# Patient Record
Sex: FEMALE | Race: WHITE | URBAN - NONMETROPOLITAN AREA
[De-identification: names, ages, dates, MRNs, and addresses within clinical notes are randomized per-mention and may not be internally consistent; named-entity substitution may affect disease eponyms.]

---

## 2022-10-14 ENCOUNTER — TELEPHONE (OUTPATIENT)
Dept: FAMILY MEDICINE CLINIC | Age: 67
End: 2022-10-14

## 2022-10-14 NOTE — TELEPHONE ENCOUNTER
LVM for Maxwell Judge letting her she could Establish, but you do not write a prescription for controlled meds or narcotics. Provided our phone number if she is agreeable to that and wanted to schedule.

## 2022-10-14 NOTE — TELEPHONE ENCOUNTER
Roberto Talavera called from [facility]. He was wanting to know if you would take on this patient? She used to see Lisa Blackmon and she needs a PCP.

## 2023-02-10 ENCOUNTER — OFFICE VISIT (OUTPATIENT)
Dept: FAMILY MEDICINE CLINIC | Age: 68
End: 2023-02-10

## 2023-02-10 VITALS
OXYGEN SATURATION: 98 % | BODY MASS INDEX: 33.23 KG/M2 | HEIGHT: 61 IN | RESPIRATION RATE: 16 BRPM | TEMPERATURE: 98.7 F | WEIGHT: 176 LBS | SYSTOLIC BLOOD PRESSURE: 130 MMHG | DIASTOLIC BLOOD PRESSURE: 82 MMHG | HEART RATE: 92 BPM

## 2023-02-10 DIAGNOSIS — M51.9 LUMBAR DISC DISEASE: ICD-10-CM

## 2023-02-10 DIAGNOSIS — K44.9 HIATAL HERNIA: Primary | ICD-10-CM

## 2023-02-10 DIAGNOSIS — F41.1 GENERALIZED ANXIETY DISORDER: ICD-10-CM

## 2023-02-10 DIAGNOSIS — E55.9 VITAMIN D INSUFFICIENCY: ICD-10-CM

## 2023-02-10 DIAGNOSIS — R73.01 IMPAIRED FASTING GLUCOSE: ICD-10-CM

## 2023-02-10 DIAGNOSIS — K21.9 GASTROESOPHAGEAL REFLUX DISEASE, UNSPECIFIED WHETHER ESOPHAGITIS PRESENT: ICD-10-CM

## 2023-02-10 DIAGNOSIS — Z13.6 SCREENING FOR CARDIOVASCULAR CONDITION: ICD-10-CM

## 2023-02-10 DIAGNOSIS — Z76.89 ENCOUNTER TO ESTABLISH CARE WITH NEW DOCTOR: ICD-10-CM

## 2023-02-10 ASSESSMENT — ENCOUNTER SYMPTOMS
DIARRHEA: 0
COUGH: 0
CONSTIPATION: 0
SHORTNESS OF BREATH: 0
NAUSEA: 0
VOMITING: 0
WHEEZING: 0
ABDOMINAL PAIN: 1
BACK PAIN: 1

## 2023-02-10 ASSESSMENT — PATIENT HEALTH QUESTIONNAIRE - PHQ9
1. LITTLE INTEREST OR PLEASURE IN DOING THINGS: 1
2. FEELING DOWN, DEPRESSED OR HOPELESS: 1
SUM OF ALL RESPONSES TO PHQ QUESTIONS 1-9: 2
SUM OF ALL RESPONSES TO PHQ9 QUESTIONS 1 & 2: 2

## 2023-02-10 NOTE — PROGRESS NOTES
2/10/23  Winsome Espino : 1955 Sex: female  Age: 69 y.o. Chief Complaint   Patient presents with    Establish Care     HPI:  69 y.o. female presents today to establish care with a new PCP. Previously seen by Dr. Gricelda Chun, but over 10 years ago. Hasn't seen a doctor since. Patient's chart, medical, surgical and medication history all reviewed. GERD  Patient complains of heartburn. This has been associated with choking on food, heartburn, and symptoms primarily relate to meals, and lying down after meals. She denies abdominal bloating, belching and eructation, deep pressure at base of neck, need to clear throat frequently, and shortness of breath. Symptoms have been present for several years. She denies dysphagia. She has not lost weight. She denies melena, hematochezia, hematemesis, and coffee ground emesis. Medical therapy in the past has included  TUMS . Had EGD by Dr. Neida Escalante in  that showed hiatal hernia. Back Pain  69 y.o. female presents today with complaint of back pain. The pain started  several  year(s) ago. The patient denies any trauma or injury. Had herniated disc many years ago. Follows with Chiro monthly. She denies numbness, tingling or weakness to the extremities. She denies any saddle anesthesia. No bowel or bladder incontinence. No fever or chills. No dysuria, urinary, frequency, or gross hematuria. No abdominal pain, nausea, vomiting, or diarrhea. No history of kidney stones. Anxiety  Patient complains of evaluation of anxiety disorder. She has the following anxiety symptoms: chest pain, racing thoughts. Onset of symptoms was approximately several years ago, stable since that time. She denies current suicidal and homicidal ideation. Previous treatment includes  none  and  no therapy . She complains of the following side effects from the treatment: none. ROS:  Review of Systems   Constitutional:  Negative for chills, fatigue and fever. Respiratory:  Negative for cough, shortness of breath and wheezing. Cardiovascular:  Negative for chest pain and palpitations. Gastrointestinal:  Positive for abdominal pain. Negative for constipation, diarrhea, nausea and vomiting. Musculoskeletal:  Positive for back pain. Negative for arthralgias. Skin:  Negative for rash. Neurological:  Negative for dizziness and headaches. Psychiatric/Behavioral:  Negative for dysphoric mood. The patient is nervous/anxious. All other systems reviewed and are negative. No current outpatient medications on file prior to visit. No current facility-administered medications on file prior to visit.        No Known Allergies    Past Medical History:   Diagnosis Date    GERD (gastroesophageal reflux disease)     Hiatal hernia     Lumbar disc disease     Shingles      Past Surgical History:   Procedure Laterality Date    CHOLECYSTECTOMY  2009    Dr. Nigel Curran       Family History   Problem Relation Age of Onset    Cirrhosis Mother 50    Diabetes type 2  Father     Stroke Father     Cancer Sister         bladder cancer    Cancer Brother         oropharyngeal cancer    Diabetes type 2  Brother     Other Brother         Pre-diabetes     Social History     Socioeconomic History    Marital status: Unknown     Spouse name: Not on file    Number of children: Not on file    Years of education: Not on file    Highest education level: Not on file   Occupational History    Not on file   Tobacco Use    Smoking status: Never    Smokeless tobacco: Never   Substance and Sexual Activity    Alcohol use: Not Currently     Alcohol/week: 11.0 standard drinks     Types: 1 Glasses of wine, 10 Shots of liquor per week    Drug use: Never    Sexual activity: Not on file   Other Topics Concern    Not on file   Social History Narrative    Not on file     Social Determinants of Health     Financial Resource Strain: Not on file   Food Insecurity: Not on file Transportation Needs: Not on file   Physical Activity: Not on file   Stress: Not on file   Social Connections: Not on file   Intimate Partner Violence: Not on file   Housing Stability: Not on file       Vitals:    02/10/23 1352   BP: 130/82   Pulse: 92   Resp: 16   Temp: 98.7 °F (37.1 °C)   TempSrc: Temporal   SpO2: 98%   Weight: 176 lb (79.8 kg)   Height: 5' 1\" (1.549 m)       Physical Exam:  Physical Exam  Vitals and nursing note reviewed. Constitutional:       General: She is not in acute distress. Appearance: Normal appearance. She is well-developed. She is obese. She is not ill-appearing. HENT:      Head: Normocephalic and atraumatic. Right Ear: Hearing and external ear normal.      Left Ear: Hearing and external ear normal.      Nose: Nose normal.      Mouth/Throat:      Mouth: Mucous membranes are moist.   Eyes:      General: Lids are normal. No scleral icterus. Extraocular Movements: Extraocular movements intact. Conjunctiva/sclera: Conjunctivae normal.   Neck:      Thyroid: No thyromegaly. Vascular: No carotid bruit. Cardiovascular:      Rate and Rhythm: Normal rate and regular rhythm. Heart sounds: Normal heart sounds. No murmur heard. Pulmonary:      Effort: Pulmonary effort is normal. No respiratory distress. Breath sounds: Normal breath sounds. No wheezing. Musculoskeletal:         General: No tenderness or deformity. Normal range of motion. Cervical back: Normal range of motion and neck supple. Right lower leg: No edema. Left lower leg: No edema. Lymphadenopathy:      Cervical: No cervical adenopathy. Skin:     General: Skin is warm and dry. Findings: No rash. Neurological:      General: No focal deficit present. Mental Status: She is alert and oriented to person, place, and time.       Gait: Gait normal.   Psychiatric:         Mood and Affect: Mood and affect normal.         Speech: Speech normal.         Behavior: Behavior normal.         Thought Content: Thought content normal.       Labs:  CBC with Differential:  No results found for: WBC, RBC, HGB, HCT, PLT, MCV, MCH, MCHC, RDW, NRBC, SEGSPCT, BANDSPCT, BLASTSPCT, METASPCT, LYMPHOPCT, PROMYELOPCT, MONOPCT, MYELOPCT, EOSPCT, BASOPCT, MONOSABS, LYMPHSABS, EOSABS, BASOSABS, DIFFTYPE  CMP:  No results found for: NA, K, CL, CO2, BUN, CREATININE, GFRAA, AGRATIO, LABGLOM, GLUCOSE, GLU, PROT, LABALBU, CALCIUM, BILITOT, ALKPHOS, AST, ALT  U/A:  No results found for: NITRITE, COLORU, PROTEINU, PHUR, LABCAST, WBCUA, RBCUA, MUCUS, TRICHOMONAS, YEAST, BACTERIA, CLARITYU, SPECGRAV, LEUKOCYTESUR, UROBILINOGEN, BILIRUBINUR, BLOODU, GLUCOSEU, AMORPHOUS  HgBA1c:  No results found for: LABA1C  FLP:  No results found for: TRIG, HDL, LDLCALC, LDLDIRECT, LABVLDL  TSH:  No results found for: TSH     Assessment and Plan:  Jae Tyson was seen today for establish care. Diagnoses and all orders for this visit:    Hiatal hernia  Discussed risk of chronic reflux and development of Duque's. Discussed starting PPI vs Pepcid. Will try Pepcid first.     Gastroesophageal reflux disease, unspecified whether esophagitis present    Lumbar disc disease  Follows with Chiro    Generalized anxiety disorder  Discussed 5-HTP. Patient doesn't like to take medications. Encounter to establish care with new doctor  -     CBC with Auto Differential; Future  -     Comprehensive Metabolic Panel; Future  -     Hemoglobin A1C; Future  -     Lipid Panel; Future  -     TSH; Future  -     Vitamin D 25 Hydroxy; Future  -     Urinalysis; Future  Patient overdue for multiple screening exams. States that she \"doesn't want to know\" if something is wrong. [facility] screening reviewed    Screening for cardiovascular condition  -     Lipid Panel; Future    Impaired fasting glucose  -     Hemoglobin A1C; Future    Vitamin D insufficiency  -     Vitamin D 25 Hydroxy;  Future      Return in about 6 months (around 8/10/2023), or if symptoms worsen or fail to improve, for AWV.       Seen By:  Vamsi Malin, DO

## 2023-03-29 ENCOUNTER — OFFICE VISIT (OUTPATIENT)
Dept: FAMILY MEDICINE CLINIC | Age: 68
End: 2023-03-29
Payer: COMMERCIAL

## 2023-03-29 VITALS
OXYGEN SATURATION: 98 % | DIASTOLIC BLOOD PRESSURE: 74 MMHG | HEIGHT: 61 IN | TEMPERATURE: 98.8 F | SYSTOLIC BLOOD PRESSURE: 116 MMHG | WEIGHT: 174 LBS | HEART RATE: 97 BPM | RESPIRATION RATE: 16 BRPM | BODY MASS INDEX: 32.85 KG/M2

## 2023-03-29 DIAGNOSIS — U07.1 COVID: Primary | ICD-10-CM

## 2023-03-29 DIAGNOSIS — B97.89 SORE THROAT (VIRAL): ICD-10-CM

## 2023-03-29 DIAGNOSIS — J02.8 SORE THROAT (VIRAL): ICD-10-CM

## 2023-03-29 DIAGNOSIS — R51.9 NONINTRACTABLE HEADACHE, UNSPECIFIED CHRONICITY PATTERN, UNSPECIFIED HEADACHE TYPE: ICD-10-CM

## 2023-03-29 LAB
Lab: ABNORMAL
PERFORMING INSTRUMENT: ABNORMAL
QC PASS/FAIL: ABNORMAL
S PYO AG THROAT QL: NORMAL
SARS-COV-2, POC: DETECTED

## 2023-03-29 PROCEDURE — 87880 STREP A ASSAY W/OPTIC: CPT | Performed by: FAMILY MEDICINE

## 2023-03-29 PROCEDURE — 1123F ACP DISCUSS/DSCN MKR DOCD: CPT | Performed by: FAMILY MEDICINE

## 2023-03-29 PROCEDURE — 87426 SARSCOV CORONAVIRUS AG IA: CPT | Performed by: FAMILY MEDICINE

## 2023-03-29 PROCEDURE — 99213 OFFICE O/P EST LOW 20 MIN: CPT | Performed by: FAMILY MEDICINE

## 2023-03-29 NOTE — PROGRESS NOTES
Jyothi Barton In    [place] presents to the office today for   Chief Complaint   Patient presents with    Generalized Body Aches    Pharyngitis     Started monday    Nausea    Fever     100.5 yesterday     Headache and fever  Started 3 days ago  Myalgias  Nauseated      Review of Systems     /74   Pulse 97   Temp 98.8 °F (37.1 °C) (Temporal)   Resp 16   Ht 5' 1\" (1.549 m)   Wt 174 lb (78.9 kg)   SpO2 98%   BMI 32.88 kg/m²   Physical Exam  Constitutional:       Appearance: Normal appearance. HENT:      Head: Normocephalic and atraumatic. Eyes:      Extraocular Movements: Extraocular movements intact. Conjunctiva/sclera: Conjunctivae normal.   Cardiovascular:      Rate and Rhythm: Normal rate. Heart sounds: Normal heart sounds. Pulmonary:      Effort: Pulmonary effort is normal.      Breath sounds: Normal breath sounds. Skin:     General: Skin is warm. Neurological:      Mental Status: She is alert and oriented to person, place, and time. Psychiatric:         Mood and Affect: Mood normal.         Behavior: Behavior normal.        No current outpatient medications on file. Past Medical History:   Diagnosis Date    GERD (gastroesophageal reflux disease)     Hiatal hernia     Lumbar disc disease     Opal Castillo was seen today for generalized body aches, pharyngitis, nausea and fever.     Diagnoses and all orders for this visit:    COVID    Sore throat (viral)  -     POCT rapid strep A  -     POCT COVID-19, Antigen    Nonintractable headache, unspecified chronicity pattern, unspecified headache type  -     POCT COVID-19, Antigen     Rapid COVID positive  5 day quarantine if meets criteria  Rest/fluids  Return precautions given  I offered Paxlovid but pt declines    Jeanette Boothe MD

## 2024-10-23 ENCOUNTER — APPOINTMENT (OUTPATIENT)
Dept: GENERAL RADIOLOGY | Age: 69
End: 2024-10-23
Payer: MEDICARE

## 2024-10-23 ENCOUNTER — HOSPITAL ENCOUNTER (EMERGENCY)
Age: 69
Discharge: HOME | End: 2024-10-23
Attending: EMERGENCY MEDICINE
Payer: MEDICARE

## 2024-10-23 VITALS
OXYGEN SATURATION: 97 % | HEART RATE: 102 BPM | DIASTOLIC BLOOD PRESSURE: 81 MMHG | HEIGHT: 61 IN | WEIGHT: 171 LBS | SYSTOLIC BLOOD PRESSURE: 154 MMHG | TEMPERATURE: 99 F | BODY MASS INDEX: 32.28 KG/M2 | RESPIRATION RATE: 18 BRPM

## 2024-10-23 DIAGNOSIS — S52.515A CLOSED NONDISPLACED FRACTURE OF STYLOID PROCESS OF LEFT RADIUS, INITIAL ENCOUNTER: ICD-10-CM

## 2024-10-23 DIAGNOSIS — S52.501A CLOSED FRACTURE OF DISTAL END OF RIGHT RADIUS, UNSPECIFIED FRACTURE MORPHOLOGY, INITIAL ENCOUNTER: Primary | ICD-10-CM

## 2024-10-23 PROCEDURE — 73110 X-RAY EXAM OF WRIST: CPT

## 2024-10-23 PROCEDURE — 73130 X-RAY EXAM OF HAND: CPT

## 2024-10-23 PROCEDURE — 2500000003 HC RX 250 WO HCPCS

## 2024-10-23 PROCEDURE — 25605 CLTX DST RDL FX/EPHYS SEP W/: CPT

## 2024-10-23 PROCEDURE — 99283 EMERGENCY DEPT VISIT LOW MDM: CPT

## 2024-10-23 RX ORDER — LIDOCAINE HYDROCHLORIDE 10 MG/ML
20 INJECTION, SOLUTION INFILTRATION; PERINEURAL ONCE
Status: COMPLETED | OUTPATIENT
Start: 2024-10-23 | End: 2024-10-23

## 2024-10-23 RX ORDER — LIDOCAINE HYDROCHLORIDE 10 MG/ML
INJECTION, SOLUTION EPIDURAL; INFILTRATION; INTRACAUDAL; PERINEURAL
Status: COMPLETED
Start: 2024-10-23 | End: 2024-10-23

## 2024-10-23 RX ORDER — OXYCODONE AND ACETAMINOPHEN 5; 325 MG/1; MG/1
1 TABLET ORAL EVERY 6 HOURS PRN
Qty: 12 TABLET | Refills: 0 | Status: SHIPPED | OUTPATIENT
Start: 2024-10-23 | End: 2024-10-26

## 2024-10-23 RX ADMIN — LIDOCAINE HYDROCHLORIDE 20 ML: 10 INJECTION, SOLUTION INFILTRATION; PERINEURAL at 15:48

## 2024-10-23 RX ADMIN — LIDOCAINE HYDROCHLORIDE 20 ML: 10 INJECTION, SOLUTION EPIDURAL; INFILTRATION; INTRACAUDAL; PERINEURAL at 15:48

## 2024-10-23 ASSESSMENT — PAIN - FUNCTIONAL ASSESSMENT
PAIN_FUNCTIONAL_ASSESSMENT: 0-10
PAIN_FUNCTIONAL_ASSESSMENT: PREVENTS OR INTERFERES SOME ACTIVE ACTIVITIES AND ADLS

## 2024-10-23 ASSESSMENT — PAIN DESCRIPTION - ORIENTATION: ORIENTATION: RIGHT

## 2024-10-23 ASSESSMENT — PAIN DESCRIPTION - DESCRIPTORS: DESCRIPTORS: SHARP;THROBBING

## 2024-10-23 ASSESSMENT — PAIN DESCRIPTION - LOCATION: LOCATION: WRIST

## 2024-10-23 ASSESSMENT — PAIN SCALES - GENERAL: PAINLEVEL_OUTOF10: 8

## 2024-10-23 NOTE — ED PROVIDER NOTES
immobilized with sugar tong.    The patient tolerated the procedure well.    Complications: None       Disposition Considerations (Tests not ordered but considered, Shared Decision Making, Pt Expectation of Test or Tx.): This is a pleasant 69-year-old female who the night prior was walking to get her cat lost her balance fell and then did land on both of her wrist.  Patient stated she has been having increasing pain worse on her right side did not notice any deformity which prompted her to come to the ER.  No reported closed head injury.  Patient is right-handed.  Patient was neurovascularly intact had an obvious deformed after speaking with the patient we did perform a hematoma block patient tolerated this well was placed in fingertrap.  Patient this reduced this did seem to improve.  Patient was splinted by myself on recheck patient was neurovascular intact good sensation.  Given the patient's radial fracture on the left my other plan was to splint this as well however the patient refused.  Patient was given orthopedics to follow-up with as well as return precaution    FINAL IMPRESSION      1. Closed fracture of distal end of right radius, unspecified fracture morphology, initial encounter    2. Closed nondisplaced fracture of styloid process of left radius, initial encounter          DISPOSITION/PLAN     DISPOSITION Decision To Discharge 10/23/2024 06:35:32 PM    PATIENT REFERRED TO:  Serg Novoa MD  [address] [address]  [phone]    Call in 1 day      [facility]  [address] [address]  [phone]  Go to   If symptoms worsen      DISCHARGE MEDICATIONS:  Discharge Medication List as of 10/23/2024  6:44 PM        START taking these medications    Details   oxyCODONE-acetaminophen (PERCOCET) 5-325 MG per tablet Take 1 tablet by mouth every 6 hours as needed for Pain for up to 3 days. Intended supply: 3

## 2024-10-28 LAB — FECAL BLOOD IMMUNOCHEMICAL TEST: NEGATIVE

## 2024-10-29 ENCOUNTER — TELEPHONE (OUTPATIENT)
Dept: ORTHOPEDIC SURGERY | Age: 69
End: 2024-10-29

## 2024-10-29 ENCOUNTER — OFFICE VISIT (OUTPATIENT)
Dept: ORTHOPEDIC SURGERY | Age: 69
End: 2024-10-29

## 2024-10-29 ENCOUNTER — PREP FOR PROCEDURE (OUTPATIENT)
Dept: ORTHOPEDIC SURGERY | Age: 69
End: 2024-10-29

## 2024-10-29 DIAGNOSIS — S52.515A CLOSED NONDISPLACED FRACTURE OF STYLOID PROCESS OF LEFT RADIUS, INITIAL ENCOUNTER: ICD-10-CM

## 2024-10-29 DIAGNOSIS — S52.501A CLOSED FRACTURE OF DISTAL END OF RIGHT RADIUS, UNSPECIFIED FRACTURE MORPHOLOGY, INITIAL ENCOUNTER: Primary | ICD-10-CM

## 2024-10-29 RX ORDER — SODIUM CHLORIDE 0.9 % (FLUSH) 0.9 %
5-40 SYRINGE (ML) INJECTION PRN
Status: CANCELLED | OUTPATIENT
Start: 2024-10-29

## 2024-10-29 RX ORDER — SODIUM CHLORIDE 0.9 % (FLUSH) 0.9 %
5-40 SYRINGE (ML) INJECTION EVERY 12 HOURS SCHEDULED
Status: CANCELLED | OUTPATIENT
Start: 2024-10-29

## 2024-10-29 RX ORDER — SODIUM CHLORIDE 9 MG/ML
INJECTION, SOLUTION INTRAVENOUS PRN
Status: CANCELLED | OUTPATIENT
Start: 2024-10-29

## 2024-10-29 NOTE — PROGRESS NOTES
New Patient Orthopaedic Progress Note    Rhonda Alcantar is a 69 y.o. female, her YOB: 1955 [facility] the [facility]:      Patient Active Problem List    Diagnosis Date Noted    Hiatal hernia 02/10/2023    Gastroesophageal reflux disease 02/10/2023    Lumbar disc disease 02/10/2023    Generalized anxiety disorder 02/10/2023     No current outpatient medications on file.     No current facility-administered medications for this visit.     Allergies: Patient has no known allergies.  Past Medical History:   Diagnosis Date    GERD (gastroesophageal reflux disease)     Hiatal hernia     Lumbar disc disease     Shingles      Past Surgical History:   Procedure Laterality Date    CHOLECYSTECTOMY  2009    Dr. Winters    ENDOMETRIAL ABLATION       Family History   Problem Relation Age of Onset    Cirrhosis Mother 48    Diabetes type 2  Father     Stroke Father     Cancer Sister         bladder cancer    Cancer Brother         oropharyngeal cancer    Diabetes type 2  Brother     Other Brother         Pre-diabetes     Social History     Tobacco Use    Smoking status: Never    Smokeless tobacco: Never   Substance Use Topics    Alcohol use: Not Currently     Alcohol/week: 11.0 standard drinks of alcohol     Types: 1 Glasses of wine, 10 Shots of liquor per week                             Chief Complaint   Patient presents with    Follow-up     ED f/u on closed fx of distal end or right radius, closed non displaced fx of styloid process left radius 10/23/24       SUBJECTIVE: Patient presents for an ED follow up. She states last Tuesday night, 10/22/24 she tripped while going up the steps and injured bilateral wrists. She went to the ED where she was found to have bilateral wrist fractures. She was not provided a brace on the left. She was reduced and placed into a right wrist splint. She denies numbness or tingling to bilateral wrist braces. She ambulates with no AD. Patient is RHD

## 2024-10-29 NOTE — PATIENT INSTRUCTIONS
Procedure: right distal radius open reduction internal fixation    You will need medical clearance prior to surgery.     Please call your doctor to set up an appointment for medical clearance if necessary as soon as possible and have the office fax your medical clearance to : Marleen Hugo CMA FAX: [phone], PHONE: [phone]    You need medical clearance from: Primary Care Provider    Your surgery is scheduled for 11/4/24 at 7am  with Dr. Narayan Lees DO at the main [place] [facility] [place] .  You will need to report to Preop area  that morning at 5am .   All surgery start times are subject to change. You will be notified by office and/or PAT department if your surgery time changes. If you need to cancel/reschedule your surgery for any reason, please contact Marleen at [phone], ext #[phone] ASAP.   You are having Outpatient surgery, but plan for 1-2 nights in the hospital for recovery if needed.  Preadmission Testing (PAT) department at [facility] will contact you with further details regarding pre-operative blood work, where to park and enter the hospital, your medication list, etc. If you have any surgery related questions please contact PAT at [facility] at [phone].  Nothing to eat or drink after midnight the night before surgery.  You may take a pain pill and any other medicine PAT instructs you to take with small sip of water if needed.  Continue with ice and elevation to reduce swelling  Non weight bearing right upper extremity, use assistive devices if needed (wheelchair, walker, crutches, cane, etc).  If you are taking Aspirin or Lovenox, hold the day of surgery. If taking Eliquis, hold this 48 hours prior to surgery. Hold all NSAIDs (non-steroidal anti-inflammatories like Advil, motrin, ibuprofen, etc) 7 days prior to surgery.    Call office with any question or concerns: [phone]    All surgical patients will be gradually tapered off narcotic pain medication

## 2024-10-29 NOTE — TELEPHONE ENCOUNTER
Prior Authorization Form:    DEMOGRAPHICS:                     Patient Name:  Jesus Alcantar  Patient :  1955            Insurance:  Payor: [facility] MEDICARE / Plan: ANTHEM MEDIBLUE ESSENTIAL/PLUS / Product Type: *No Product type* /   Insurance ID Number:    Payer/Plan Subscr  Sex Relation Sub. Ins. ID Effective Group Num   1. [facility] MEDICARE* JSEUS ALCANTAR* 1955 Female Self [id number] [facility]                                    [id number]       [] Prior authorization obtained  [x] No Prior authorization required for CPT 08560 for dates 2024 through 2024 per [employer] (in ).  [] Prior authorization pending - Case #       DIAGNOSIS & PROCEDURE:                       Procedure/Operation: Right Distal Radius Fracture ORIF            CPT Code: 93329    Diagnosis:  Closed Fracture of Right Distal Radius     ICD10 Code: S52.501A    Location:  [facility]    Surgeon:  Dr. Lees     SCHEDULING INFORMATION:                          Date: 2024    Time: 7:00 am               Anesthesia:  General                                                        Status: Outpatient     Special Comments:         Vendor: [facility]  []   Notified     Length of Surgery (with 30 min clean up time): 1.5 hours    Medical clearance: Medical Clearance Requested    Pre-Op Labs:       []  Orders Placed    Electronically signed by Marleen Hugo MA on 10/29/2024 at 12:18 PM

## 2024-11-01 ENCOUNTER — TELEPHONE (OUTPATIENT)
Dept: FAMILY MEDICINE CLINIC | Age: 69
End: 2024-11-01

## 2024-11-01 ENCOUNTER — TELEPHONE (OUTPATIENT)
Dept: ORTHOPEDIC SURGERY | Age: 69
End: 2024-11-01

## 2024-11-01 NOTE — TELEPHONE ENCOUNTER
Called and spoke with Yamileth @ [facility] Surgery Scheduling Desk. Surgery is cancelled for 11-4-2024.

## 2024-11-01 NOTE — TELEPHONE ENCOUNTER
Marleen  [facility].      She is calling for surgery clearance for this patient who will be scheduled for surgery soon to repair a Right radius fracture.         It looks like she has not been seen since 2/10/2023.      Should I try to get her on your schedule asap?

## 2024-11-01 NOTE — TELEPHONE ENCOUNTER
Received call from Dr. Baker office. PCP will not clear the patient for surgery without being seen in their office for evaluation. Last time she was seen by PCP was over a year ago on 2-. PCP does not have availability to see the patient until 11- @ 3:45 pm     PCP office has spoken with the patient. She is aware of her 11- appt, she understands surgery will be cancelled for 11-, and she will have to be medically cleared before surgery can be r/s with Dr. Lees.

## 2024-11-01 NOTE — TELEPHONE ENCOUNTER
They have her scheduled on Monday.  I can't give medical clearance to her without seeing her.  It has been well over a year since I've seen her and she didn't do any of the labs or things that I had ordered at that time.  I don't have availability today to do a clearance.  So they will either have to do the surgery without clearance or postpone it until I can get her in to evaluate her

## 2024-11-01 NOTE — TELEPHONE ENCOUNTER
Patient is scheduled for Right Distal Radius Fracture ORIF surgery on 11-4-2024 with Dr. Lees. She was seen in our office on 10-. Surgery was decided at this office visit and the patient was instructed to obtain medical clearance from her PCP prior to her surgery date. Request for Medical Clearance Form was faxed to PCP office, Dr. Margie Baker, on 10-.    I spoke with MIR Morris @ Dr. Baker office today. PCP office was not aware the patient was having surgery done or needed medical clearance. Alexandra is going to send a message to Dr. Baker regarding needing Medical Clearance for surgery. I provided my direct phone number and fax number.

## 2024-11-12 ENCOUNTER — OFFICE VISIT (OUTPATIENT)
Dept: ORTHOPEDIC SURGERY | Age: 69
End: 2024-11-12

## 2024-11-12 ENCOUNTER — TELEPHONE (OUTPATIENT)
Dept: ORTHOPEDIC SURGERY | Age: 69
End: 2024-11-12

## 2024-11-12 ENCOUNTER — PREP FOR PROCEDURE (OUTPATIENT)
Dept: ORTHOPEDIC SURGERY | Age: 69
End: 2024-11-12

## 2024-11-12 VITALS
RESPIRATION RATE: 16 BRPM | TEMPERATURE: 97 F | OXYGEN SATURATION: 97 % | DIASTOLIC BLOOD PRESSURE: 83 MMHG | SYSTOLIC BLOOD PRESSURE: 154 MMHG | HEART RATE: 94 BPM

## 2024-11-12 DIAGNOSIS — S52.515A CLOSED NONDISPLACED FRACTURE OF STYLOID PROCESS OF LEFT RADIUS, INITIAL ENCOUNTER: ICD-10-CM

## 2024-11-12 DIAGNOSIS — S52.501A CLOSED FRACTURE OF DISTAL END OF RIGHT RADIUS, UNSPECIFIED FRACTURE MORPHOLOGY, INITIAL ENCOUNTER: Primary | ICD-10-CM
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
## 2024-11-12 ASSESSMENT — PATIENT HEALTH QUESTIONNAIRE - PHQ9
1. LITTLE INTEREST OR PLEASURE IN DOING THINGS: NOT AT ALL
SUM OF ALL RESPONSES TO PHQ9 QUESTIONS 1 & 2: 0
1. LITTLE INTEREST OR PLEASURE IN DOING THINGS: NOT AT ALL
SUM OF ALL RESPONSES TO PHQ QUESTIONS 1-9: 0
SUM OF ALL RESPONSES TO PHQ QUESTIONS 1-9: 0
2. FEELING DOWN, DEPRESSED OR HOPELESS: NOT AT ALL
SUM OF ALL RESPONSES TO PHQ QUESTIONS 1-9: 0
SUM OF ALL RESPONSES TO PHQ9 QUESTIONS 1 & 2: 0
2. FEELING DOWN, DEPRESSED OR HOPELESS: NOT AT ALL
SUM OF ALL RESPONSES TO PHQ QUESTIONS 1-9: 0

## 2024-11-12 NOTE — TELEPHONE ENCOUNTER
Prior Authorization Form:    DEMOGRAPHICS:                     Patient Name:  Jesus Alcantar  Patient :  1955            Insurance:  Payor: [facility] MEDICARE / Plan: ANTHEM MEDIBLUE ESSENTIAL/PLUS / Product Type: *No Product type* /   Insurance ID Number:    Payer/Plan Subscr  Sex Relation Sub. Ins. ID Effective Group Num   1. [facility] MEDICARE* JESUS ALCANTAR* 1955 Female Self [id number] [id number]                                    [id number]       [] Prior authorization obtained    [x] No Prior authorization required for CPT Code 99248, 97008 per [employer] Portal (uploaded into [employer]).     [] Prior authorization pending - Case #       DIAGNOSIS & PROCEDURE:                       Procedure/Operation: Right Distal Radius Fracture ORIF (Intra-Articular)           CPT Code: 99820 vs. 87472    Diagnosis:  Closed Fracture of Distal End of Right Radius, Accidental Trip and Fall at Home     ICD10 Code: S52.501A, W01.0XXA    Location:  [facility]    Surgeon:  Dr. Lees     SCHEDULING INFORMATION:                          Date: 2024    Time: 11:30 am/12:00 pm              Anesthesia:  General                                                        Status: Outpatient     Special Comments:         Vendor: [employer]  []   Notified     Length of Surgery (with 30 min clean up time): 1.5 hours    Medical clearance: Medical Clearance Requested from PCP. Patient has appointment already scheduled with her PCP on 11-.    Pre-Op Labs:       []  Orders Placed    Electronically signed by Marleen Hugo MA on 2024 at 11:28 AM

## 2024-11-12 NOTE — PROGRESS NOTES
stiffness, as well as the possible need further surgery and unanticipated complications.  The patient voiced understanding and all questions were answered. The patient elected to proceed with surgical intervention.     Patient's blood pressure in the office today was found to be elevated as listed above. Patient denies having symptoms of chest pain, shortness of breath, dizziness, headache or changes in vision.  Patient does not take anti-hypertensive medications. Because of patient's elevated blood pressure in office we recommended that Rhonda JOHNSON Ortiz follows up with their primary care provider for blood pressure recheck. She has an appointment this Friday with her PCP. She states her blood pressure has been elevated for years. Patient is instructed on importance of blood pressure control and advised of signs and symptoms of when to seek further evaluation in the emergency department.           Electronically signed by Stacie Crews PA-C on 11/12/2024 at 9:06 AM  Note: This report was completed using [employer] voiced recognition software.  Every effort has been made to ensure accuracy; however, inadvertent computerized transcription errors may be present.

## 2024-11-12 NOTE — PROGRESS NOTES
[facility]   PRE-ADMISSION TESTING GENERAL INSTRUCTIONS  PAT Phone Number: [phone]      GENERAL INSTRUCTIONS:    [x] Antibacterial Soap Shower Night before AND the Morning of procedure.    [x] Do not wear makeup, lotions, powders, deodorant the morning of surgery.  [x] No solid food after midnight. You may have SIPS of clear liquids up until 2 hours before your arrival time to the hospital.   [x] You may brush your teeth, gargle, but do not swallow water.   [x] No tobacco products, illegal drugs, marijuana  or alcohol within 24 hours of your surgery.  [x] Jewelry or valuables should not be brought to the hospital. All body and/or tongue piercing's must be removed prior to arriving to hospital. No contact lens or hair pins.   [x] Arrange transportation with a responsible adult  to and from the hospital. Arrange for someone to be with you for the remainder of the day and for 24 hours after your procedure due to having had anesthesia.          -Who will be your  for transportation? Luis Eduardo, brother        -Who will be staying with you for 24 hrs after your procedure? Luis Eduardo  [x] Bring insurance card and photo ID.    PARKING INSTRUCTIONS:     [x] ARRIVAL DATE & TIME: 11/18 10:30 am  [x] Times are subject to change. We will contact you the business day before surgery if that were to occur.  [x] Enter into the [facility] Entrance. Two people may accompany you. Masks are not required.  [x] Parking Lot \"I\" is where you will park. It is located on the corner of [address] and [address]. The entrance is on [address].   Only one vehicle - per patient, is permitted in parking lot.   Upon entering the parking lot, a voucher ticket will print.    EDUCATION INSTRUCTIONS:             [x] Pain: Post-op pain is normal and to be expected. You will be asked to rate your pain from 0-10.    MEDICATION INSTRUCTIONS:    [x] Bring a complete list of your medications, please

## 2024-11-12 NOTE — PATIENT INSTRUCTIONS
WB:  Non-weight bearing on right upper and left upper extremity    Velcro wrist brace: On at all times, to the left upper extremity. Remove daily for evaluation of skin breakdown    Continue with ice to the injured extremity 2-3 times per day for swelling  If able continue with elevation and compression    Procedure: Right distal radius open reduction internal fixation    You will need medical clearance prior to surgery.     Please call your doctor to set up an appointment for medical clearance if necessary as soon as possible and have the office fax your medical clearance to : Marleen Hugo CMA FAX: [phone], PHONE: [phone]    You need medical clearance from: Primary Care Provider    Your surgery is scheduled for 11/18/24 at 11:30am with Dr. Narayan Lees DO at the main [facility] in [place] .  You will need to report to Preop area  that morning at 9:30am .   All surgery start times are subject to change. You will be notified by office and/or PAT department if your surgery time changes. If you need to cancel/reschedule your surgery for any reason, please contact Marleen at [phone], ext #[phone] ASAP.   You are having Outpatient surgery, but plan for 1-2 nights in the hospital for recovery if needed.  Preadmission Testing (PAT) department at [facility] will contact you with further details regarding pre-operative blood work, where to park and enter the hospital, your medication list, etc. If you have any surgery related questions please contact PAT at [facility] at [phone].  Nothing to eat or drink after midnight the night before surgery.  You may take a pain pill and any other medicine PAT instructs you to take with small sip of water if needed.  Continue with ice and elevation to reduce swelling  Non weight bearing right upper extremity, use assistive devices if needed (wheelchair, walker, crutches, cane, etc).  If you are taking Aspirin or Lovenox, hold the day of

## 2024-11-12 NOTE — H&P (VIEW-ONLY)
distal radius with continued displacement of the fracture fragment when compared to x-rays from October 23, 2024.        ASSESSMENT:     Diagnosis Orders   1. Closed fracture of distal end of right radius, unspecified fracture morphology, initial encounter  XR WRIST RIGHT (MIN 3 VIEWS)      2. Closed nondisplaced fracture of styloid process of left radius, initial encounter  XR WRIST LEFT (MIN 3 VIEWS)          Discussion: Had lengthy discussion with patient regarding Her diagnosis, typical prognosis, and expected outcomes. I reviewed the possible complications from the injury itself despite treatment choosen. I also discussed treatment options including nonoperative managements versus surgical management, along with risks and benefits of each. They have elected for surgical management at this time.      PLAN:  Discussed findings with the patient.  Did discuss her left distal radius fracture has displaced and she does have an intra-articular step-off.  Highly encouraged the patient to start wearing her cock up wrist brace to the left upper extremity and to avoid any lifting anterior May nonweightbearing to bilateral upper extremities.  Discussed her right upper extremity.  Discussed that her fracture has continued to displace.  She does have an unstable fracture pattern to her right distal radius.  Discussed with Dr. Lees.  We continue to recommend a right distal radius ORIF.  Patient will be scheduled for Monday, November 18, 2024.  She does have an appointment with her primary care physician on Friday.  Postoperative course explained to the patient.  Patient like to proceed.  All questions answered    I explained the risks, benefits, alternatives and complications of surgery with the patient including but not limited to the risks of death, possible damage to nerves, vessels, or tendons, possible infection, possible nonunion, possible malunion, possible hardware failure, possible need for hardware removal,

## 2024-11-13 RX ORDER — SODIUM CHLORIDE 0.9 % (FLUSH) 0.9 %
5-40 SYRINGE (ML) INJECTION PRN
Status: CANCELLED | OUTPATIENT
Start: 2024-11-13

## 2024-11-13 RX ORDER — SODIUM CHLORIDE 9 MG/ML
INJECTION, SOLUTION INTRAVENOUS PRN
Status: CANCELLED | OUTPATIENT
Start: 2024-11-13

## 2024-11-13 RX ORDER — SODIUM CHLORIDE 0.9 % (FLUSH) 0.9 %
5-40 SYRINGE (ML) INJECTION EVERY 12 HOURS SCHEDULED
Status: CANCELLED | OUTPATIENT
Start: 2024-11-13

## 2024-11-13 NOTE — TELEPHONE ENCOUNTER
[facility] Call Center    Phone Message    May a detailed message be left on voicemail: yes     Reason for Call: Other: Pt needs home health care referral faxed to the central intake dept [phone] for pt to get started with home health care      Action Taken: Other: ortho     Travel Screening: Not Applicable                                                                       Preop orders done

## 2024-11-15 ENCOUNTER — OFFICE VISIT (OUTPATIENT)
Dept: FAMILY MEDICINE CLINIC | Age: 69
End: 2024-11-15

## 2024-11-15 ENCOUNTER — TELEPHONE (OUTPATIENT)
Dept: FAMILY MEDICINE CLINIC | Age: 69
End: 2024-11-15

## 2024-11-15 VITALS
HEIGHT: 61 IN | RESPIRATION RATE: 16 BRPM | SYSTOLIC BLOOD PRESSURE: 148 MMHG | DIASTOLIC BLOOD PRESSURE: 88 MMHG | HEART RATE: 94 BPM | OXYGEN SATURATION: 99 % | TEMPERATURE: 98.4 F | WEIGHT: 165 LBS | BODY MASS INDEX: 31.15 KG/M2

## 2024-11-15 DIAGNOSIS — Z01.818 PRE-OP EXAM: ICD-10-CM

## 2024-11-15 DIAGNOSIS — S52.501G CLOSED FRACTURE OF DISTAL END OF RIGHT RADIUS WITH DELAYED HEALING, UNSPECIFIED FRACTURE MORPHOLOGY, SUBSEQUENT ENCOUNTER: Primary | ICD-10-CM

## 2024-11-15 PROBLEM — F41.1 GENERALIZED ANXIETY DISORDER: Status: RESOLVED | Noted: 2023-02-10 | Resolved: 2024-11-15

## 2024-11-15 LAB
ALBUMIN: 4.6 G/DL (ref 3.5–5.2)
ALP BLD-CCNC: 83 U/L (ref 35–104)
ALT SERPL-CCNC: 15 U/L (ref 0–32)
ANION GAP SERPL CALCULATED.3IONS-SCNC: 12 MMOL/L (ref 7–16)
AST SERPL-CCNC: 15 U/L (ref 0–31)
BASOPHILS ABSOLUTE: 0.06 K/UL (ref 0–0.2)
BASOPHILS RELATIVE PERCENT: 1 % (ref 0–2)
BILIRUB SERPL-MCNC: 0.5 MG/DL (ref 0–1.2)
BUN BLDV-MCNC: 10 MG/DL (ref 6–23)
CALCIUM SERPL-MCNC: 9.9 MG/DL (ref 8.6–10.2)
CHLORIDE BLD-SCNC: 103 MMOL/L (ref 98–107)
CO2: 25 MMOL/L (ref 22–29)
CREAT SERPL-MCNC: 0.8 MG/DL (ref 0.5–1)
EOSINOPHILS ABSOLUTE: 0.09 K/UL (ref 0.05–0.5)
EOSINOPHILS RELATIVE PERCENT: 1 % (ref 0–6)
GFR, ESTIMATED: 80 ML/MIN/1.73M2
GLUCOSE BLD-MCNC: 91 MG/DL (ref 74–99)
HCT VFR BLD CALC: 40.3 % (ref 34–48)
HEMOGLOBIN: 13.1 G/DL (ref 11.5–15.5)
IMMATURE GRANULOCYTES %: 0 % (ref 0–5)
IMMATURE GRANULOCYTES ABSOLUTE: <0.03 K/UL (ref 0–0.58)
LYMPHOCYTES ABSOLUTE: 2.06 K/UL (ref 1.5–4)
LYMPHOCYTES RELATIVE PERCENT: 31 % (ref 20–42)
MCH RBC QN AUTO: 30.1 PG (ref 26–35)
MCHC RBC AUTO-ENTMCNC: 32.5 G/DL (ref 32–34.5)
MCV RBC AUTO: 92.6 FL (ref 80–99.9)
MONOCYTES ABSOLUTE: 0.37 K/UL (ref 0.1–0.95)
MONOCYTES RELATIVE PERCENT: 6 % (ref 2–12)
NEUTROPHILS ABSOLUTE: 4.03 K/UL (ref 1.8–7.3)
NEUTROPHILS RELATIVE PERCENT: 61 % (ref 43–80)
PDW BLD-RTO: 12.6 % (ref 11.5–15)
PLATELET # BLD: 351 K/UL (ref 130–450)
PMV BLD AUTO: 11.7 FL (ref 7–12)
POTASSIUM SERPL-SCNC: 4 MMOL/L (ref 3.5–5)
RBC # BLD: 4.35 M/UL (ref 3.5–5.5)
SODIUM BLD-SCNC: 140 MMOL/L (ref 132–146)
TOTAL PROTEIN: 7.3 G/DL (ref 6.4–8.3)
WBC # BLD: 6.6 K/UL (ref 4.5–11.5)

## 2024-11-15 SDOH — ECONOMIC STABILITY: FOOD INSECURITY: WITHIN THE PAST 12 MONTHS, YOU WORRIED THAT YOUR FOOD WOULD RUN OUT BEFORE YOU GOT MONEY TO BUY MORE.: NEVER TRUE

## 2024-11-15 SDOH — ECONOMIC STABILITY: INCOME INSECURITY: HOW HARD IS IT FOR YOU TO PAY FOR THE VERY BASICS LIKE FOOD, HOUSING, MEDICAL CARE, AND HEATING?: NOT HARD AT ALL

## 2024-11-15 SDOH — ECONOMIC STABILITY: FOOD INSECURITY: WITHIN THE PAST 12 MONTHS, THE FOOD YOU BOUGHT JUST DIDN'T LAST AND YOU DIDN'T HAVE MONEY TO GET MORE.: NEVER TRUE

## 2024-11-15 ASSESSMENT — ENCOUNTER SYMPTOMS
WHEEZING: 0
DIARRHEA: 0
VOMITING: 0
SHORTNESS OF BREATH: 0
BACK PAIN: 1
NAUSEA: 0
COUGH: 0
ABDOMINAL PAIN: 0
CONSTIPATION: 0

## 2024-11-15 NOTE — PROGRESS NOTES
I spoke to office of Dr. Baker, confirmed the EKG will be done with her medical clearance appt this afternoon.  I explained i placed the order in her chart.  Office will do ekg.

## 2024-11-15 NOTE — TELEPHONE ENCOUNTER
[facility]       Radha calling about the visit Rhonda has with you today for Preop Clearance.     She is asking that an EKG be done on today's visit?     I let her know that the preop visits here generally include an EKG, but would get this message to the provider.  Just GEORGE.

## 2024-11-15 NOTE — PROGRESS NOTES
(CARDIA)     Difficulty of Paying Living Expenses: Not hard at all   Food Insecurity: No Food Insecurity (11/15/2024)    Hunger Vital Sign     Worried About Running Out of Food in the Last Year: Never true     Ran Out of Food in the Last Year: Never true   Transportation Needs: Unknown (11/15/2024)    PRAPARE - Transportation     Lack of Transportation (Medical): Not on file     Lack of Transportation (Non-Medical): No   Physical Activity: Not on file   Stress: Not on file   Social Connections: Not on file   Intimate Partner Violence: Not on file   Housing Stability: Unknown (11/15/2024)    Housing Stability Vital Sign     Unable to Pay for Housing in the Last Year: Not on file     Number of Times Moved in the Last Year: Not on file     Homeless in the Last Year: No       Vitals:    11/15/24 1534   BP: (!) 148/88   Pulse: 94   Resp: 16   Temp: 98.4 °F (36.9 °C)   TempSrc: Temporal   SpO2: 99%   Weight: 74.8 kg (165 lb)   Height: 1.549 m (5' 1\")       Physical Exam:  Physical Exam  Vitals and nursing note reviewed.   Constitutional:       General: She is not in acute distress.     Appearance: Normal appearance. She is well-developed. She is obese. She is not ill-appearing.   HENT:      Head: Normocephalic and atraumatic.      Right Ear: Hearing and external ear normal.      Left Ear: Hearing and external ear normal.      Nose: Nose normal.      Mouth/Throat:      Mouth: Mucous membranes are moist.   Eyes:      General: Lids are normal. No scleral icterus.     Extraocular Movements: Extraocular movements intact.      Conjunctiva/sclera: Conjunctivae normal.   Neck:      Thyroid: No thyromegaly.      Vascular: No carotid bruit.   Cardiovascular:      Rate and Rhythm: Normal rate and regular rhythm.      Heart sounds: Normal heart sounds. No murmur heard.  Pulmonary:      Effort: Pulmonary effort is normal. No respiratory distress.      Breath sounds: Normal breath sounds. No wheezing.   Musculoskeletal:

## 2024-11-15 NOTE — ASSESSMENT & PLAN NOTE
EKG reviewed- NSR.  No previous EKG for comparison.  Never had baseline labs at our establishing appt.  Will get CBC/CMP today.  Medically optimized at this time for surgery with Dr. Lees.

## 2024-11-17 ENCOUNTER — ANESTHESIA EVENT (OUTPATIENT)
Dept: OPERATING ROOM | Age: 69
End: 2024-11-17
Payer: MEDICARE

## 2024-11-18 ENCOUNTER — ANESTHESIA (OUTPATIENT)
Dept: OPERATING ROOM | Age: 69
End: 2024-11-18
Payer: MEDICARE

## 2024-11-18 ENCOUNTER — APPOINTMENT (OUTPATIENT)
Dept: GENERAL RADIOLOGY | Age: 69
End: 2024-11-18
Attending: ORTHOPAEDIC SURGERY
Payer: MEDICARE

## 2024-11-18 ENCOUNTER — HOSPITAL ENCOUNTER (OUTPATIENT)
Age: 69
Setting detail: OUTPATIENT SURGERY
Discharge: HOME | End: 2024-11-18
Attending: ORTHOPAEDIC SURGERY | Admitting: ORTHOPAEDIC SURGERY
Payer: MEDICARE

## 2024-11-18 VITALS
OXYGEN SATURATION: 97 % | BODY MASS INDEX: 32.28 KG/M2 | SYSTOLIC BLOOD PRESSURE: 164 MMHG | TEMPERATURE: 97.1 F | WEIGHT: 171 LBS | DIASTOLIC BLOOD PRESSURE: 97 MMHG | HEART RATE: 80 BPM | HEIGHT: 61 IN | RESPIRATION RATE: 26 BRPM

## 2024-11-18 DIAGNOSIS — S52.501P CLOSED FRACTURE OF DISTAL END OF RIGHT RADIUS WITH MALUNION, UNSPECIFIED FRACTURE MORPHOLOGY, SUBSEQUENT ENCOUNTER: ICD-10-CM

## 2024-11-18 DIAGNOSIS — Z01.818 PRE-OP TESTING: Primary | ICD-10-CM

## 2024-11-18 LAB
BASOPHILS # BLD: 0.06 K/UL (ref 0–0.2)
BASOPHILS NFR BLD: 1 % (ref 0–2)
EOSINOPHIL # BLD: 0.09 K/UL (ref 0.05–0.5)
EOSINOPHILS RELATIVE PERCENT: 2 % (ref 0–6)
ERYTHROCYTE [DISTWIDTH] IN BLOOD BY AUTOMATED COUNT: 12.5 % (ref 11.5–15)
HCT VFR BLD AUTO: 41.9 % (ref 34–48)
HGB BLD-MCNC: 14 G/DL (ref 11.5–15.5)
IMM GRANULOCYTES # BLD AUTO: <0.03 K/UL (ref 0–0.58)
IMM GRANULOCYTES NFR BLD: 0 % (ref 0–5)
LYMPHOCYTES NFR BLD: 1.92 K/UL (ref 1.5–4)
LYMPHOCYTES RELATIVE PERCENT: 32 % (ref 20–42)
MCH RBC QN AUTO: 30.3 PG (ref 26–35)
MCHC RBC AUTO-ENTMCNC: 33.4 G/DL (ref 32–34.5)
MCV RBC AUTO: 90.7 FL (ref 80–99.9)
MONOCYTES NFR BLD: 0.38 K/UL (ref 0.1–0.95)
MONOCYTES NFR BLD: 6 % (ref 2–12)
NEUTROPHILS NFR BLD: 59 % (ref 43–80)
NEUTS SEG NFR BLD: 3.49 K/UL (ref 1.8–7.3)
PLATELET # BLD AUTO: 324 K/UL (ref 130–450)
PMV BLD AUTO: 11.1 FL (ref 7–12)
RBC # BLD AUTO: 4.62 M/UL (ref 3.5–5.5)
WBC OTHER # BLD: 6 K/UL (ref 4.5–11.5)

## 2024-11-18 PROCEDURE — 6360000002 HC RX W HCPCS: Performed by: ANESTHESIOLOGY

## 2024-11-18 PROCEDURE — 2580000003 HC RX 258: Performed by: PHYSICIAN ASSISTANT

## 2024-11-18 PROCEDURE — 64415 NJX AA&/STRD BRCH PLXS IMG: CPT | Performed by: ANESTHESIOLOGY

## 2024-11-18 PROCEDURE — C1713 ANCHOR/SCREW BN/BN,TIS/BN: HCPCS | Performed by: ORTHOPAEDIC SURGERY

## 2024-11-18 PROCEDURE — 2720000010 HC SURG SUPPLY STERILE: Performed by: ORTHOPAEDIC SURGERY

## 2024-11-18 PROCEDURE — 7100000000 HC PACU RECOVERY - FIRST 15 MIN: Performed by: ORTHOPAEDIC SURGERY

## 2024-11-18 PROCEDURE — 2500000003 HC RX 250 WO HCPCS

## 2024-11-18 PROCEDURE — 3600000004 HC SURGERY LEVEL 4 BASE: Performed by: ORTHOPAEDIC SURGERY

## 2024-11-18 PROCEDURE — 2709999900 HC NON-CHARGEABLE SUPPLY: Performed by: ORTHOPAEDIC SURGERY

## 2024-11-18 PROCEDURE — 73110 X-RAY EXAM OF WRIST: CPT

## 2024-11-18 PROCEDURE — 6360000002 HC RX W HCPCS

## 2024-11-18 PROCEDURE — 7100000001 HC PACU RECOVERY - ADDTL 15 MIN: Performed by: ORTHOPAEDIC SURGERY

## 2024-11-18 PROCEDURE — 3600000014 HC SURGERY LEVEL 4 ADDTL 15MIN: Performed by: ORTHOPAEDIC SURGERY

## 2024-11-18 PROCEDURE — 3700000000 HC ANESTHESIA ATTENDED CARE: Performed by: ORTHOPAEDIC SURGERY

## 2024-11-18 PROCEDURE — 3700000001 HC ADD 15 MINUTES (ANESTHESIA): Performed by: ORTHOPAEDIC SURGERY

## 2024-11-18 PROCEDURE — 85025 COMPLETE CBC W/AUTO DIFF WBC: CPT

## 2024-11-18 PROCEDURE — 6360000002 HC RX W HCPCS: Performed by: PHYSICIAN ASSISTANT

## 2024-11-18 PROCEDURE — 7100000011 HC PHASE II RECOVERY - ADDTL 15 MIN: Performed by: ORTHOPAEDIC SURGERY

## 2024-11-18 PROCEDURE — 7100000010 HC PHASE II RECOVERY - FIRST 15 MIN: Performed by: ORTHOPAEDIC SURGERY

## 2024-11-18 DEVICE — SCREW BNE L20MM DIA2.4MM DST RAD VOLAR S STL ST VAR ANG LOK: Type: IMPLANTABLE DEVICE | Site: WRIST | Status: FUNCTIONAL

## 2024-11-18 DEVICE — SCREW BNE L16MM DIA2.7MM CORT S STL ST T8 STARDRV RECESS: Type: IMPLANTABLE DEVICE | Site: WRIST | Status: FUNCTIONAL

## 2024-11-18 DEVICE — PLATE BNE W22XL54MM STD 9 H R DST RAD VOLAR S STL VAR ANG: Type: IMPLANTABLE DEVICE | Site: WRIST | Status: FUNCTIONAL

## 2024-11-18 DEVICE — SCREW BNE L18MM DIA2.4MM DST RAD VOLAR S STL ST VAR ANG LOK: Type: IMPLANTABLE DEVICE | Site: WRIST | Status: FUNCTIONAL

## 2024-11-18 RX ORDER — DEXMEDETOMIDINE HYDROCHLORIDE 100 UG/ML
INJECTION, SOLUTION INTRAVENOUS
Status: DISCONTINUED | OUTPATIENT
Start: 2024-11-18 | End: 2024-11-18 | Stop reason: SDUPTHER

## 2024-11-18 RX ORDER — SODIUM CHLORIDE 9 MG/ML
INJECTION, SOLUTION INTRAVENOUS PRN
Status: DISCONTINUED | OUTPATIENT
Start: 2024-11-18 | End: 2024-11-18 | Stop reason: HOSPADM

## 2024-11-18 RX ORDER — MEPERIDINE HYDROCHLORIDE 25 MG/ML
12.5 INJECTION INTRAMUSCULAR; INTRAVENOUS; SUBCUTANEOUS EVERY 5 MIN PRN
Status: DISCONTINUED | OUTPATIENT
Start: 2024-11-18 | End: 2024-11-18 | Stop reason: HOSPADM

## 2024-11-18 RX ORDER — HYDROMORPHONE HYDROCHLORIDE 2 MG/ML
INJECTION, SOLUTION INTRAMUSCULAR; INTRAVENOUS; SUBCUTANEOUS
Status: DISCONTINUED | OUTPATIENT
Start: 2024-11-18 | End: 2024-11-18 | Stop reason: SDUPTHER

## 2024-11-18 RX ORDER — HYDROMORPHONE HYDROCHLORIDE 1 MG/ML
0.5 INJECTION, SOLUTION INTRAMUSCULAR; INTRAVENOUS; SUBCUTANEOUS EVERY 5 MIN PRN
Status: DISCONTINUED | OUTPATIENT
Start: 2024-11-18 | End: 2024-11-18 | Stop reason: HOSPADM

## 2024-11-18 RX ORDER — MIDAZOLAM HYDROCHLORIDE 2 MG/2ML
2 INJECTION, SOLUTION INTRAMUSCULAR; INTRAVENOUS ONCE
Status: COMPLETED | OUTPATIENT
Start: 2024-11-18 | End: 2024-11-18

## 2024-11-18 RX ORDER — OXYCODONE HYDROCHLORIDE 5 MG/1
5 TABLET ORAL EVERY 6 HOURS PRN
Qty: 28 TABLET | Refills: 0 | Status: SHIPPED | OUTPATIENT
Start: 2024-11-18 | End: 2024-11-25

## 2024-11-18 RX ORDER — SODIUM CHLORIDE 0.9 % (FLUSH) 0.9 %
5-40 SYRINGE (ML) INJECTION PRN
Status: DISCONTINUED | OUTPATIENT
Start: 2024-11-18 | End: 2024-11-18 | Stop reason: HOSPADM

## 2024-11-18 RX ORDER — GLYCOPYRROLATE 0.2 MG/ML
INJECTION INTRAMUSCULAR; INTRAVENOUS
Status: DISCONTINUED | OUTPATIENT
Start: 2024-11-18 | End: 2024-11-18 | Stop reason: SDUPTHER

## 2024-11-18 RX ORDER — PROPOFOL 10 MG/ML
INJECTION, EMULSION INTRAVENOUS
Status: DISCONTINUED | OUTPATIENT
Start: 2024-11-18 | End: 2024-11-18 | Stop reason: SDUPTHER

## 2024-11-18 RX ORDER — KETOROLAC TROMETHAMINE 30 MG/ML
INJECTION, SOLUTION INTRAMUSCULAR; INTRAVENOUS
Status: DISCONTINUED | OUTPATIENT
Start: 2024-11-18 | End: 2024-11-18 | Stop reason: SDUPTHER

## 2024-11-18 RX ORDER — ROPIVACAINE HYDROCHLORIDE 5 MG/ML
30 INJECTION, SOLUTION EPIDURAL; INFILTRATION; PERINEURAL ONCE
Status: DISCONTINUED | OUTPATIENT
Start: 2024-11-18 | End: 2024-11-18 | Stop reason: HOSPADM

## 2024-11-18 RX ORDER — ROPIVACAINE HYDROCHLORIDE 5 MG/ML
INJECTION, SOLUTION EPIDURAL; INFILTRATION; PERINEURAL
Status: COMPLETED | OUTPATIENT
Start: 2024-11-18 | End: 2024-11-18

## 2024-11-18 RX ORDER — SODIUM CHLORIDE 0.9 % (FLUSH) 0.9 %
5-40 SYRINGE (ML) INJECTION EVERY 12 HOURS SCHEDULED
Status: DISCONTINUED | OUTPATIENT
Start: 2024-11-18 | End: 2024-11-18 | Stop reason: HOSPADM

## 2024-11-18 RX ORDER — ONDANSETRON 2 MG/ML
INJECTION INTRAMUSCULAR; INTRAVENOUS
Status: DISCONTINUED | OUTPATIENT
Start: 2024-11-18 | End: 2024-11-18 | Stop reason: SDUPTHER

## 2024-11-18 RX ORDER — NALOXONE HYDROCHLORIDE 0.4 MG/ML
INJECTION, SOLUTION INTRAMUSCULAR; INTRAVENOUS; SUBCUTANEOUS PRN
Status: DISCONTINUED | OUTPATIENT
Start: 2024-11-18 | End: 2024-11-18 | Stop reason: HOSPADM

## 2024-11-18 RX ORDER — FENTANYL CITRATE 50 UG/ML
100 INJECTION, SOLUTION INTRAMUSCULAR; INTRAVENOUS ONCE
Status: COMPLETED | OUTPATIENT
Start: 2024-11-18 | End: 2024-11-18

## 2024-11-18 RX ORDER — DEXAMETHASONE SODIUM PHOSPHATE 10 MG/ML
INJECTION INTRAMUSCULAR; INTRAVENOUS
Status: DISCONTINUED | OUTPATIENT
Start: 2024-11-18 | End: 2024-11-18 | Stop reason: SDUPTHER

## 2024-11-18 RX ORDER — FENTANYL CITRATE 50 UG/ML
INJECTION, SOLUTION INTRAMUSCULAR; INTRAVENOUS
Status: DISCONTINUED | OUTPATIENT
Start: 2024-11-18 | End: 2024-11-18 | Stop reason: SDUPTHER

## 2024-11-18 RX ORDER — LIDOCAINE HYDROCHLORIDE 20 MG/ML
INJECTION, SOLUTION INTRAVENOUS
Status: DISCONTINUED | OUTPATIENT
Start: 2024-11-18 | End: 2024-11-18 | Stop reason: SDUPTHER

## 2024-11-18 RX ADMIN — DEXMEDETOMIDINE HCL 20 MCG: 100 INJECTION INTRAVENOUS at 14:42

## 2024-11-18 RX ADMIN — DEXAMETHASONE SODIUM PHOSPHATE 10 MG: 10 INJECTION INTRAMUSCULAR; INTRAVENOUS at 14:15

## 2024-11-18 RX ADMIN — PHENYLEPHRINE HYDROCHLORIDE 50 MCG: 10 INJECTION INTRAVENOUS at 15:16

## 2024-11-18 RX ADMIN — PROPOFOL 150 MG: 10 INJECTION, EMULSION INTRAVENOUS at 14:10

## 2024-11-18 RX ADMIN — PROPOFOL 20 MG: 10 INJECTION, EMULSION INTRAVENOUS at 14:22

## 2024-11-18 RX ADMIN — MIDAZOLAM 2 MG: 1 INJECTION INTRAMUSCULAR; INTRAVENOUS at 12:33

## 2024-11-18 RX ADMIN — FENTANYL CITRATE 50 MCG: 50 INJECTION, SOLUTION INTRAMUSCULAR; INTRAVENOUS at 14:19

## 2024-11-18 RX ADMIN — HYDROMORPHONE HYDROCHLORIDE 1 MG: 2 INJECTION, SOLUTION INTRAMUSCULAR; INTRAVENOUS; SUBCUTANEOUS at 14:39

## 2024-11-18 RX ADMIN — DEXMEDETOMIDINE HCL 20 MCG: 100 INJECTION INTRAVENOUS at 14:34

## 2024-11-18 RX ADMIN — FENTANYL CITRATE 50 MCG: 50 INJECTION, SOLUTION INTRAMUSCULAR; INTRAVENOUS at 14:25

## 2024-11-18 RX ADMIN — ONDANSETRON 4 MG: 2 INJECTION INTRAMUSCULAR; INTRAVENOUS at 15:20

## 2024-11-18 RX ADMIN — SODIUM CHLORIDE: 9 INJECTION, SOLUTION INTRAVENOUS at 10:34

## 2024-11-18 RX ADMIN — LIDOCAINE HYDROCHLORIDE 80 MG: 20 INJECTION, SOLUTION INTRAVENOUS at 14:10

## 2024-11-18 RX ADMIN — ROPIVACAINE HYDROCHLORIDE 30 ML: 5 INJECTION EPIDURAL; INFILTRATION; PERINEURAL at 12:48

## 2024-11-18 RX ADMIN — CEFAZOLIN 2000 MG: 2 INJECTION, POWDER, FOR SOLUTION INTRAMUSCULAR; INTRAVENOUS at 14:20

## 2024-11-18 RX ADMIN — GLYCOPYRROLATE 0.2 MG: 1 INJECTION INTRAMUSCULAR; INTRAVENOUS at 14:34

## 2024-11-18 RX ADMIN — FENTANYL CITRATE 100 MCG: 50 INJECTION INTRAMUSCULAR; INTRAVENOUS at 12:33

## 2024-11-18 RX ADMIN — PROPOFOL 20 MG: 10 INJECTION, EMULSION INTRAVENOUS at 14:26

## 2024-11-18 RX ADMIN — KETOROLAC TROMETHAMINE 30 MG: 30 INJECTION, SOLUTION INTRAMUSCULAR at 15:20

## 2024-11-18 RX ADMIN — PHENYLEPHRINE HYDROCHLORIDE 50 MCG: 10 INJECTION INTRAVENOUS at 14:57

## 2024-11-18 RX ADMIN — SODIUM CHLORIDE: 9 INJECTION, SOLUTION INTRAVENOUS at 14:04

## 2024-11-18 ASSESSMENT — LIFESTYLE VARIABLES: SMOKING_STATUS: 0

## 2024-11-18 ASSESSMENT — PAIN SCALES - GENERAL
PAINLEVEL_OUTOF10: 0

## 2024-11-18 NOTE — ANESTHESIA PROCEDURE NOTES
Peripheral Block    Patient location during procedure: procedure area  Reason for block: post-op pain management and at surgeon's request  Start time: 11/18/2024 12:48 PM  Staffing  Performed: anesthesiologist   Anesthesiologist: Sabino Londono MD  Performed by: Sabino Londono MD  Authorized by: Sabino Londono MD    Preanesthetic Checklist  Completed: patient identified, IV checked, site marked, risks and benefits discussed, surgical/procedural consents, equipment checked, pre-op evaluation, timeout performed, anesthesia consent given, oxygen available and monitors applied/VS acknowledged  Peripheral Block   Patient position: sitting  Prep: ChloraPrep  Provider prep: mask and sterile gloves  Patient monitoring: cardiac monitor, continuous pulse ox, frequent blood pressure checks and IV access  Block type: Brachial plexus  Interscalene  Laterality: left  Injection technique: single-shot  Guidance: ultrasound guided  Local infiltration: lidocaine  Infiltration strength: 1 %  Local infiltration: lidocaine  Dose: 3 mL    Needle   Needle type: insulated echogenic nerve stimulator needle   Needle gauge: 21 G  Needle localization: ultrasound guidance  Needle length: 5 cm  Assessment   Injection assessment: negative aspiration for heme, no paresthesia on injection and local visualized surrounding nerve on ultrasound  Paresthesia pain: none  Slow fractionated injection: yes  Hemodynamics: stable  Outcomes: uncomplicated and patient tolerated procedure well    Additional Notes  U/S [id number].  (1) Under ultrasound guidance, a  gauge needle was inserted and placed in close proximity to the nerve.  (2) Ultrasound was also used to visualize the spread of the anesthetic in close proximity to the nerve being blocked. (3) The nerve appeared anatomically normal, and (4 there were no apparent abnormal pathological findings on the image that were readily visible and related to the nerve being blocked. (5) A permanent

## 2024-11-18 NOTE — PROGRESS NOTES
1630  xrays completed. Fingers remains  pink, warm with brisk refill and with minimal movement post block  1730

## 2024-11-18 NOTE — INTERVAL H&P NOTE
Update History & Physical    The patient's History and Physical of November 12, 2024 was reviewed with the patient and I examined the patient. There was no change. The surgical site was confirmed by the patient and me.     Plan: Right distal radius fracture ORIF     I have explained the risks and complications of the recommended surgery with the patient at length, as well as discussed potential treatment alternatives including nonoperative management. These risks include but are not limited to death or complication from anesthesia, continued pain, nerve tendon or vascular injury, infection, nonunion or malunion, symptomatic hardware or hardware failure, deep vein thrombosis or pulmonary embolism, stiffness, weakness, unforeseen complications, and need for further surgery, etc.  Patient understood this, asked appropriate questions, which were all answered, and she has elected to proceed with the procedure.    Electronically signed by ANDRÉS QUIÑONEZ DO on 11/18/2024 at 11:23 AM

## 2024-11-18 NOTE — PROGRESS NOTES
1545  patient transferred to PACU post ORIF  wrist and peripheral nerve block. Patient identification verified with surgery personnel.  Right arm elevated.  Fingers warm with brisk refill.  Oral airway in place.respiratory effort  - non ;labored.  Regular  deep  rate 12.

## 2024-11-18 NOTE — ANESTHESIA POSTPROCEDURE EVALUATION
Department of Anesthesiology  Postprocedure Note    Patient: Rhonda Alcantar  MRN: [id number]  YOB: 1955  Date of evaluation: 11/18/2024    Procedure Summary       Date: 11/18/24 Room / Location: [address]    Anesthesia Start:  Anesthesia Stop:     Procedure: RIGHT DISTAL RADIUS FRACTURE, OPEN REDUCTION WITH INTERNAL FIXATION (Right) Diagnosis:       Closed fracture of right distal radius      Fall due to stumbling      (Closed fracture of right distal radius [S52.501A])      (Fall due to stumbling [W01.0XXA])    Surgeons: Narayan Lees DO Responsible Provider:     Anesthesia Type: Not recorded ASA Status: Not recorded            Anesthesia Type: General, Regional    Jovany Phase I: Jovany Score: 8    Jovany Phase II:      Anesthesia Post Evaluation    Patient location during evaluation: PACU  Patient participation: complete - patient participated  Level of consciousness: awake  Pain score: 3  Airway patency: patent  Nausea & Vomiting: no nausea and no vomiting  Cardiovascular status: blood pressure returned to baseline  Respiratory status: acceptable  Hydration status: euvolemic      No notable events documented.

## 2024-11-18 NOTE — DISCHARGE INSTRUCTIONS
[facility] Department of Orthopedic Surgery  [address] [address]    Dr. Narayan Lees, DO         MD Dr. Narayan Bernal MD Frank Ansevin, PA-C Sara Zatchok, PA-C Tyler Tsangaris PA-C      Orthopaedics Discharge Instructions   Weight bearing Status - Non-weight bearing - on Right Upper Extremity  Pain Medication   Contact Office for Medication Refill- [phone]  Office can refill pain medications no sooner than every 7 days  If patient discharging to facility then pain control will be continued per facility physician  Ice to operative/injured site for 15-30 minutes of each hour for next 5 days    Recommend that you continue to ice the area 2-3 times per day after this   Elevate operative/injured limb on 2 pillows at home  Goal is to have limb above the heart if able  Wound care/Fracture Care - maintain splint, keep clean, dry and intact    Follow up in office in approximately 2 weeks. Your first follow up appointment is often with one of our physician assistants.     Call the office at [phone] if having any concerns.     Watch for these significant complications.  Call physician if they or any other problems occur:  Fever over 101°, redness, swelling or warmth at the operative site  Unrelieved nausea    Foul smelling or cloudy drainage at the operative site   Unrelieved pain    Blood soaked dressing. (Some oozing may be normal)     Numb, pale, blue, cold or tingling extremity          It is the Department of Orthopaedic Trauma's standard of practice that providers will de-escalate (wean) all patients from narcotic (opioid) medications during the post-operative period.   We provide multimodal pain control, but opioid medications are tapered in all of our patients.  If patient requires referral to pain management for prolonged taper from opioid pain medication, we will facilitate this process.        Weight bearing is an important component to your healing

## 2024-11-18 NOTE — OP NOTE
Operative Note      Patient: Rhonda Alcantar  YOB: 1955  MRN: [id number]    Date of Procedure: 11/18/2024    Pre-Op Diagnosis Codes:      * Closed fracture of right distal radius [S52.501A]     * Fall due to stumbling [W01.0XXA]    Post-Op Diagnosis: Same       Procedure(s):  RIGHT DISTAL RADIUS FRACTURE, OPEN REDUCTION WITH INTERNAL FIXATION    Surgeon(s):  Narayan Lees DO    Assistant:   Resident: Cem Herndon DO; Cesar Rodriguez DO    Anesthesia: General    Estimated Blood Loss (mL): Minimal    Complications: None    Specimens:   * No specimens in log *    Implants:  Implant Name Type Inv. Item Serial No.  Lot No. LRB No. Used Action   PLATE BNE [id number] STD 9 H R DST RAD VOLAR S STL FLORIAN ANG - [id number]  PLATE BNE [id number] STD 9 H R DST RAD VOLAR S STL FLORIAN ANG  [employer]-  Right 1 Implanted   SCREW BNE L14MM DIA2.7MM CAROLINE S STL ST T8 STARDRV RECESS - [id number]  SCREW BNE L14MM DIA2.7MM CAROLINE S STL ST T8 STARDRV RECESS  DEPUY [employer]ES [place]-WD  Right 1 Implanted   SCREW BNE L18MM DIA2.4MM DST RAD VOLAR S STL ST FLORIAN ANG ARIS - [id number]  SCREW BNE L18MM DIA2.4MM DST RAD VOLAR S STL ST FLORIAN ANG ARIS  DEPU[employer]-WD  Right 5 Implanted   SCREW BNE L20MM DIA2.4MM DST RAD VOLAR S STL ST FLORIAN ANG ARIS - [id number]  SCREW BNE L20MM DIA2.4MM DST RAD VOLAR S STL ST FLORIAN ANG ARIS  DEPUY [employer]-WD  Right 1 Implanted   SCREW BNE L16MM DIA2.7MM CAROLINE S STL ST T8 STARDRV RECESS - [id number]  SCREW BNE L16MM DIA2.7MM CAROLINE S STL ST T8 STARDRV RECESS  DEPUY SY[employer]ES [place]-WD  Right 2 Implanted         Drains: * No LDAs found *    Findings:  Infection Present At Time Of Surgery (PATOS) (choose all levels that have infection present):  No infection present  Other Findings: 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  After

## 2024-11-18 NOTE — ANESTHESIA PRE PROCEDURE
Known Allergies    Problem List:    Patient Active Problem List   Diagnosis Code    Hiatal hernia K44.9    Gastroesophageal reflux disease K21.9    Lumbar disc disease M51.9    Closed fracture of right distal radius S52.501A    Fall due to stumbling W01.0XXA       Past Medical History:        Diagnosis Date    GERD (gastroesophageal reflux disease)     Hiatal hernia     Lumbar disc disease     Shingles        Past Surgical History:        Procedure Laterality Date    CHOLECYSTECTOMY  2009    Dr. Winters    ENDOMETRIAL ABLATION         Social History:    Social History     Tobacco Use    Smoking status: Never    Smokeless tobacco: Never   Substance Use Topics    Alcohol use: Yes     Alcohol/week: 11.0 standard drinks of alcohol     Types: 1 Glasses of wine, 10 Shots of liquor per week     Comment: 3 drinks per night                                Counseling given: Not Answered      Vital Signs (Current):   Vitals:    11/18/24 1240 11/18/24 1245 11/18/24 1300 11/18/24 1315   BP: (!) 151/76 (!) 142/68 (!) 146/71 (!) 155/73   Pulse: 75 83 76 71   Resp: 13 14 23 22   Temp:       TempSrc:       SpO2: 100% 100% 98% 98%   Weight:       Height:                                                  BP Readings from Last 3 Encounters:   11/18/24 (!) 155/73   11/15/24 (!) 148/88   11/12/24 (!) 154/83       NPO Status: Time of last liquid consumption: 0800                        Time of last solid consumption: 1800                        Date of last liquid consumption: 11/18/24                        Date of last solid food consumption: 11/17/24    BMI:   Wt Readings from Last 3 Encounters:   11/12/24 77.6 kg (171 lb)   11/15/24 74.8 kg (165 lb)   10/23/24 77.6 kg (171 lb)     Body mass index is 32.31 kg/m².    CBC:   Lab Results   Component Value Date/Time    WBC 6.0 11/18/2024 10:32 AM    RBC 4.62 11/18/2024 10:32 AM    HGB 14.0 11/18/2024 10:32 AM    HCT 41.9 11/18/2024 10:32 AM    MCV 90.7 11/18/2024 10:32 AM    RDW 12.5

## 2024-11-21 ENCOUNTER — ENROLLMENT (OUTPATIENT)
Dept: PHARMACY | Facility: CLINIC | Age: 69
End: 2024-11-21

## 2024-12-03 ENCOUNTER — OFFICE VISIT (OUTPATIENT)
Dept: ORTHOPEDIC SURGERY | Age: 69
End: 2024-12-03

## 2024-12-03 VITALS
SYSTOLIC BLOOD PRESSURE: 173 MMHG | TEMPERATURE: 97.7 F | OXYGEN SATURATION: 98 % | DIASTOLIC BLOOD PRESSURE: 80 MMHG | HEART RATE: 101 BPM

## 2024-12-03 DIAGNOSIS — S52.501A CLOSED FRACTURE OF DISTAL END OF RIGHT RADIUS, UNSPECIFIED FRACTURE MORPHOLOGY, INITIAL ENCOUNTER: Primary | ICD-10-CM

## 2024-12-03 PROCEDURE — 99024 POSTOP FOLLOW-UP VISIT: CPT | Performed by: PHYSICIAN ASSISTANT

## 2024-12-03 NOTE — PATIENT INSTRUCTIONS
Nonweightbearing right upper extremity.    Patient will be set up for therapy at [facility] in [place].    If Steri-Strips do not fall off right wrist incision in 7 days on their own, okay to remove.    Patient was placed into a removable right wrist brace.  Okay to remove brace for hygiene, therapy and periodically while at home to work on range of motion exercises of the wrist and digits.    Follow-up in 4 weeks for reevaluation and x-rays.    Call if any questions or concerns

## 2024-12-03 NOTE — PROGRESS NOTES
Chief Complaint   Patient presents with    Post-Op Check     Right distal radius ORIF 11/18/24. She is doing well overall but still sore. She is using the hand slightly        OP:SURGEON: Dr. Narayan Lees,   DATE OF PROCEDURE: 11-18-24  PROCEDURE: RIGHT DISTAL RADIUS FRACTURE, OPEN REDUCTION WITH INTERNAL FIXATION     POD: 2 weeks    Subjective:  Rhonda Alcantar is following up from the above surgery. She is NWB on right upper extremity. She ambulates with no assistive device.  Pain to extremity is mild and is not taking prescribed pain medication. They denies numbness or tingling to the right upper extremity. Denies calf pain, chest pain, or shortness of breath. Patient is not participating in therapy at this time.  She is doing well after surgery.  She does complain of some swelling in the hand.    Review of Systems -  All pertinent positives/negatives per HPI     Objective:    General: Alert and oriented X 3, normocephalic atraumatic, external ears and eye normal, sclera clear, no acute distress, respirations easy and unlabored with no audible wheezes, skin warm and dry, speech and dress appropriate for noted age, affect euthymic.    Extremity:  Right Upper Extremity  Skin is clean dry and intact   Mild-moderate edema noted to the wrist/hand  2+ Radial pulse, fingers warm with BCR  Flex/extension intact to wrist, thumb and fingers   Finger opposition intact  Finger adduction/abduction intact  Finger crossover intact  able to make concentric fist with some difficulty due to swelling  Subjectively states sensation is intact to light touch over the Median Nerve, Ulnar Nerve, and Radial Nerve distribution  Incision well-healed, sutures removed and Steri-Strips applied    BP (!) 160/73   Pulse (!) 101   Temp 97.7 °F (36.5 °C)   SpO2 98%     XR:   3 views right wrist demonstrate s/p ORIF right distal radius fracture with hardware in stable position and alignment.  No evidence of hardware loosening or

## 2024-12-05 ENCOUNTER — TELEPHONE (OUTPATIENT)
Dept: PHYSICAL THERAPY | Age: 69
End: 2024-12-05

## 2024-12-05 ENCOUNTER — TELEPHONE (OUTPATIENT)
Dept: ORTHOPEDIC SURGERY | Age: 69
End: 2024-12-05

## 2024-12-05 DIAGNOSIS — S52.501A CLOSED FRACTURE OF DISTAL END OF RIGHT RADIUS, UNSPECIFIED FRACTURE MORPHOLOGY, INITIAL ENCOUNTER: Primary | ICD-10-CM

## 2024-12-05 DIAGNOSIS — S52.515A CLOSED NONDISPLACED FRACTURE OF STYLOID PROCESS OF LEFT RADIUS, INITIAL ENCOUNTER: ICD-10-CM

## 2024-12-05 NOTE — TELEPHONE ENCOUNTER
Alix from the [facility] called. She is requesting a script for OT be sent to them.  Script was put in for PT and sent to [facility]. They need a script for OT and their OT is at the [facility].

## 2024-12-05 NOTE — TELEPHONE ENCOUNTER
Taking physical therapy referral out of this que. Received new referral for occupational therapy. Patient will get treatment at [facility].  Thank you.

## 2024-12-10 ENCOUNTER — HOSPITAL ENCOUNTER (OUTPATIENT)
Dept: OCCUPATIONAL THERAPY | Age: 69
Setting detail: THERAPIES SERIES
Discharge: HOME | End: 2024-12-10
Payer: MEDICARE

## 2024-12-10 PROCEDURE — 97165 OT EVAL LOW COMPLEX 30 MIN: CPT | Performed by: OCCUPATIONAL THERAPIST

## 2024-12-10 PROCEDURE — 97140 MANUAL THERAPY 1/> REGIONS: CPT | Performed by: OCCUPATIONAL THERAPIST

## 2024-12-10 PROCEDURE — 97110 THERAPEUTIC EXERCISES: CPT | Performed by: OCCUPATIONAL THERAPIST

## 2024-12-10 NOTE — PROGRESS NOTES
OCCUPATIONAL THERAPY INITIAL EVALUATION    [facility]  PAN OCCUPATIONAL THERAPY  [address] [facility] [address] [address]  Dept: [phone]  Loc: [phone]   [facility] OT Fax: [phone]    Date:  12/10/2024  Initial Evaluation Date: 12/10/2024   Evaluating Therapist: Dilcia Carvajal OT    Patient Name:  Rhonda Alcantar    :  1955    Restrictions/Precautions:  Per Radius ORIF Protocol, low fall risk  Diagnosis:  R Wrist Radius ORIF & Ular Styloid Fx & L wrist fx too; S52.501A (ICD-10-CM) - Closed fracture of distal end of right radius, unspecified fracture morphology, initial encounter; S52.515A (ICD-10-CM) - Closed nondisplaced fracture of styloid process of left radius, initial encounter   Date of Surgery/Injury: 2024 sx    Insurance/Certification information:  BCBS Medicare - [employer]   Plan of care signed (Y/N): N  Visit# / total visits:     Referring Practitioner:  Stacie Crews PA-C  Specific Practitioner Orders: BRITTANY HORVATH.  Work on ROM and strengthening of the wrist, hand and digits.     Assessment of current deficits   [x] ADLs  [x] IADLs  [x] Strength   [x] ROM  [x] Pain  [x] Sensation    [x]Fine Motor Coordination [x] Edema   [x] Scar Adhesion/Skin Integrity   [x] Motor Endurance     OT PLAN OF CARE   OT POC based on physician orders, patient diagnosis and results of clinical assessment.    CPT Codes requested for additional visits: 64675, 24201, 68897, 71475, 46552, 53291, 78533, 79393    Frequency/Duration: 2-3x/wk for 18 visits  /  Certification Period From: 12/10/2024  To: 3/10/2025    Specific OT Treatment to include:   [x] Instruction in HEP                   Modalities:  [x] Therapeutic Exercise        [x] Ultrasound             [x] PROM/Stretching                    [x] Fluidotherapy          [x]  Paraffin                   [x] AAROM  [x] AROM                   [x] Tendon Glides                                       [x] ADL/IADL re-training         [x]

## 2024-12-16 ENCOUNTER — HOSPITAL ENCOUNTER (OUTPATIENT)
Dept: OCCUPATIONAL THERAPY | Age: 69
Setting detail: THERAPIES SERIES
Discharge: HOME | End: 2024-12-16
Payer: MEDICARE

## 2024-12-16 PROCEDURE — 97110 THERAPEUTIC EXERCISES: CPT

## 2024-12-16 PROCEDURE — 97530 THERAPEUTIC ACTIVITIES: CPT

## 2024-12-16 NOTE — PROGRESS NOTES
Occupational Therapy    OCCUPATIONAL THERAPY DAILY TX NOTE  [facility]  PAN OCCUPATIONAL THERAPY  [address] [facility] [address]  Dept: [phone]  Loc: [phone]   [facility] OT Fax: [phone]     Date:  2024   Initial Evaluation Date: 12/10/2024                          Evaluating Therapist: Dilcia Carvajal OT     Patient Name:  Rhonda Alcantar                       :  1955     Restrictions/Precautions:  Per Radius ORIF Protocol, low fall risk  Diagnosis:  R Wrist Radius ORIF & Ular Styloid Fx & L wrist fx too; S52.501A (ICD-10-CM) - Closed fracture of distal end of right radius, unspecified fracture morphology, initial encounter; S52.515A (ICD-10-CM) - Closed nondisplaced fracture of styloid process of left radius, initial encounter   Date of Surgery/Injury: 2024 sx     Insurance/Certification information:  BCBS Medicare - [employer]   Plan of care signed (Y/N): Yes- electronically signed IE  Visit# / total visits:      Referring Practitioner:  Stacie Crews PA-C  Specific Practitioner Orders: BRITTANY HORVATH.  Work on ROM and strengthening of the wrist, hand and digits.      Assessment of current deficits   [x] ADLs          [x] IADLs         [x] Strength      [x] ROM          [x] Pain            [x] Sensation     [x]Fine Motor Coordination     [x] Edema         [x] Scar Adhesion/Skin Integrity   [x] Motor Endurance       OT PLAN OF CARE   OT POC based on physician orders, patient diagnosis and results of clinical assessment.        Frequency/Duration: 2-3x/wk for 18 visits  /  Certification Period From: 12/10/2024  To: 3/10/2025     Specific OT Treatment to include:   [x] Instruction in HEP                   Modalities:  [x] Therapeutic Exercise                 [x] Ultrasound             [x] PROM/Stretching                    [x] Fluidotherapy          [x]  Paraffin                   [x] AAROM  [x] AROM                   [x] Tendon Glides

## 2024-12-18 ENCOUNTER — HOSPITAL ENCOUNTER (OUTPATIENT)
Dept: OCCUPATIONAL THERAPY | Age: 69
Setting detail: THERAPIES SERIES
Discharge: HOME | End: 2024-12-18
Payer: MEDICARE

## 2024-12-18 ENCOUNTER — HOSPITAL ENCOUNTER (OUTPATIENT)
Dept: OCCUPATIONAL THERAPY | Age: 69
Setting detail: THERAPIES SERIES
End: 2024-12-18
Payer: MEDICARE

## 2024-12-18 PROCEDURE — 97110 THERAPEUTIC EXERCISES: CPT

## 2024-12-18 PROCEDURE — 97140 MANUAL THERAPY 1/> REGIONS: CPT

## 2024-12-18 NOTE — PROGRESS NOTES
Occupational Therapy    OCCUPATIONAL THERAPY DAILY TX NOTE  [facility]  PAN OCCUPATIONAL THERAPY  [address] [facility] [address]  Dept: [phone]  Loc: [phone]   [facility] OT Fax: [phone]     Date:  2024   Initial Evaluation Date: 12/10/2024                          Evaluating Therapist: Dilcia Carvajal OT     Patient Name:  Rhonda Alcantar                       :  1955     Restrictions/Precautions:  Per Radius ORIF Protocol, low fall risk  Diagnosis:  R Wrist Radius ORIF & Ular Styloid Fx & L wrist fx too; S52.501A (ICD-10-CM) - Closed fracture of distal end of right radius, unspecified fracture morphology, initial encounter; S52.515A (ICD-10-CM) - Closed nondisplaced fracture of styloid process of left radius, initial encounter   Date of Surgery/Injury: 2024 sx (4 weeks post)     Insurance/Certification information:  BCBS Medicare - [employer] Tracking#[id number]   Plan of care signed (Y/N): Yes- electronically signed IE  Visit# / total visits:  until 3/10/25     Referring Practitioner:  Stacie Crews PA-C  Specific Practitioner Orders: RBITTANY HORVATH.  Work on ROM and strengthening of the wrist, hand and digits.      Assessment of current deficits   [x] ADLs          [x] IADLs         [x] Strength      [x] ROM          [x] Pain            [x] Sensation     [x]Fine Motor Coordination     [x] Edema         [x] Scar Adhesion/Skin Integrity   [x] Motor Endurance       OT PLAN OF CARE   OT POC based on physician orders, patient diagnosis and results of clinical assessment.        Frequency/Duration: 2-3x/wk for 18 visits  /  Certification Period From: 12/10/2024  To: 3/10/2025     Specific OT Treatment to include:   [x] Instruction in HEP                   Modalities:  [x] Therapeutic Exercise                 [x] Ultrasound             [x] PROM/Stretching                    [x] Fluidotherapy          [x]  Paraffin                   [x] AAROM  [x] AROM

## 2024-12-23 ENCOUNTER — TELEPHONE (OUTPATIENT)
Dept: PHARMACY | Facility: CLINIC | Age: 69
End: 2024-12-23

## 2024-12-23 NOTE — TELEPHONE ENCOUNTER
[facility] CLINICAL PHARMACY REVIEW: RECENT FRACTURE    Rhonda Alcantar is a 69 y.o. old White (non-) female patient who recently had a fracture of distal end of right radius and styloid process of left radius (10/23/24 ED visit; s/p fall)    No results found for: \"VITD25\"     Lab Results   Component Value Date    CALCIUM 9.9 11/15/2024     estimated creatinine clearance is 60 mL/min (based on SCr of 0.8 mg/dL).    DEXA:  No previous DEXA noted    FRAX-calculated 10-year fracture probability:   Per WHO calculator: major Osteoporotic = 19% and Hip = 4.0%    Assessment:   - AACE recommends BMD testing in adults who have a fracture at or after age 50; USPSTF and ACP recommend DEXA for women at or after age 65  69 y.o. female with recent fracture and may benefit from DEXA to assess current BMD  - If targeting vitamin D  ng/ml: Unable to assess as no level noted.  - had been ordered with 2/10/23 PCP OV, not completed by patient and now   - As per 2/10/23 PCP OV, to establish with new PCP: \"Previously seen by Dr. Montana Baker, but over 10 years ago. Hasn't seen a doctor since ... Patient overdue for multiple screening exams. States that she \"doesn't want to know\" if something is wrong\"    Considerations:  - Suggest obtaining DEXA and Consider getting vitamin D level    As noted, appears patient likely resistant to screening?   Will send [employer] message.    Dilcia Jimenez, PharmD, BCACP  Population Health Pharmacist  [facility] Clinical Pharmacy  Department, toll free: [phone], option 1     =======================================================   For Pharmacy Admin Tracking Only    Program: [facility]  CPA in place:  No  Gap Closed?: No   Time Spent (min): 15

## 2024-12-27 ENCOUNTER — HOSPITAL ENCOUNTER (OUTPATIENT)
Dept: OCCUPATIONAL THERAPY | Age: 69
Setting detail: THERAPIES SERIES
Discharge: HOME | End: 2024-12-27
Payer: MEDICARE

## 2024-12-27 PROCEDURE — 97140 MANUAL THERAPY 1/> REGIONS: CPT | Performed by: OCCUPATIONAL THERAPIST

## 2024-12-27 PROCEDURE — 97022 WHIRLPOOL THERAPY: CPT | Performed by: OCCUPATIONAL THERAPIST

## 2024-12-27 PROCEDURE — 97110 THERAPEUTIC EXERCISES: CPT | Performed by: OCCUPATIONAL THERAPIST

## 2024-12-27 NOTE — PROGRESS NOTES
tolerance for passive to the digits and wrist today. Will continue to progress as tolerated.    -Rehab Potential: Good  -Requires OT Follow Up: Yes    Time In: 900             Time Out: 955                CODE  Minutes  Units   50187 Fluidotherapy 10 1   40155 Manual 20 1   86756 Therapeutic Ex 25 2   13661 Therapeutic Activity     12394 Paraffin     17994 Ultrasound      Other        55 4       Plan:   [x]  Continues Plan of care: Treatment covered based on POC and graduated to patient's progress. Pt education continues at each visit to obtain maximum benefits from skilled OT intervention.  []  Alter Plan of care:   []  Discharge:      Dilcia Carvajal MS, OTR/L #[id number]

## 2024-12-30 ENCOUNTER — HOSPITAL ENCOUNTER (OUTPATIENT)
Dept: OCCUPATIONAL THERAPY | Age: 69
Setting detail: THERAPIES SERIES
Discharge: HOME | End: 2024-12-30
Payer: MEDICARE

## 2024-12-30 PROCEDURE — 97110 THERAPEUTIC EXERCISES: CPT

## 2024-12-30 PROCEDURE — 97140 MANUAL THERAPY 1/> REGIONS: CPT

## 2025-01-02 ENCOUNTER — HOSPITAL ENCOUNTER (OUTPATIENT)
Dept: OCCUPATIONAL THERAPY | Age: 70
Setting detail: THERAPIES SERIES
Discharge: HOME | End: 2025-01-02
Payer: MEDICARE

## 2025-01-02 PROCEDURE — 97110 THERAPEUTIC EXERCISES: CPT

## 2025-01-02 PROCEDURE — 97140 MANUAL THERAPY 1/> REGIONS: CPT

## 2025-01-02 NOTE — PROGRESS NOTES
Assessment/Comments:  Pt arrives to tx session reporting her wrist felt okay after the added light resistive ex's completed during previous session. Therapist now issues pt hand outs for her to follow at home for wrist & forearm, all planes w/resistive ex's as noted above. Therapist ^es UBE resistance to 2.0 for improving light fxl resistive use BUEs as well as promoting composite tight fist R hand. Pt reports ^d discomfort with full composite digital flexion R hand. Pts R hand/wrist edema decreasing. Pt overall tolerates session well. Pts R composite fist full end of session. Will continue to progress as tolerated.      -Rehab Potential: Good  -Requires OT Follow Up: Yes    Time In: 11:00am             Time Out: 12pm                CODE  Minutes  Units   14736 Fluidotherapy     83948 Manual 20 1   25303 Therapeutic Ex 40 3   00367 Therapeutic Activity     11335 Paraffin     01043 Ultrasound      Other        60 4       Plan:   [x]  Continues Plan of care: Treatment covered based on POC and graduated to patient's progress. Pt education continues at each visit to obtain maximum benefits from skilled OT intervention.  []  Alter Plan of care:   []  Discharge:      Anisha IRWIN, [id number]

## 2025-01-07 ENCOUNTER — OFFICE VISIT (OUTPATIENT)
Dept: ORTHOPEDIC SURGERY | Age: 70
End: 2025-01-07

## 2025-01-07 ENCOUNTER — HOSPITAL ENCOUNTER (OUTPATIENT)
Dept: OCCUPATIONAL THERAPY | Age: 70
Setting detail: THERAPIES SERIES
Discharge: HOME | End: 2025-01-07
Payer: MEDICARE

## 2025-01-07 VITALS
DIASTOLIC BLOOD PRESSURE: 75 MMHG | SYSTOLIC BLOOD PRESSURE: 155 MMHG | RESPIRATION RATE: 20 BRPM | TEMPERATURE: 96.6 F | OXYGEN SATURATION: 98 % | HEART RATE: 91 BPM

## 2025-01-07 DIAGNOSIS — S52.501A CLOSED FRACTURE OF DISTAL END OF RIGHT RADIUS, UNSPECIFIED FRACTURE MORPHOLOGY, INITIAL ENCOUNTER: Primary | ICD-10-CM

## 2025-01-07 PROCEDURE — 97140 MANUAL THERAPY 1/> REGIONS: CPT

## 2025-01-07 PROCEDURE — 99024 POSTOP FOLLOW-UP VISIT: CPT | Performed by: ORTHOPAEDIC SURGERY

## 2025-01-07 PROCEDURE — 97110 THERAPEUTIC EXERCISES: CPT

## 2025-01-07 NOTE — PROGRESS NOTES
forearm- all planes. Pt tolerates well. Mild discomfort reported R side. Pt has hand out to follow for home. Pt displays good understanding of ex's   Other:     HEP X Freeman UE ROM, stretches, R wrist edema & scar mgmt            Assessment/Comments:  Pt arrives to tx session reporting \" I just came from seeing Dr. Lees. He said I am healing well- everything is where its supposed to be.\" Pt reports her wrist was okay after the added 1# dumbbell ex's completed during previous session and which she has cont'd to complete 1 x a day at home. Therapist ^es UBE resistance to 3.0 for increasing fxl resistive use BUEs as well as promoting composite tight fist R hand. Therapist also adds light resistance orange/yellow mix theraputty for gripping, rolling & R hand MP flexion stretches. Therapist issues & instructs pt with for HEP 1-2 x a day. Pt displays good understanding of use & precautions. Pt reports ^d discomfort with full composite MP flexion R hand. Pt overall tolerates session well. Therapist assesses pts  strength below for first time. Pts R composite fist full end of session. Will continue to progress as tolerated.        1/07/25  Strength Assessment:  R  30#, L  45#        -Rehab Potential: Good  -Requires OT Follow Up: Yes    Time In: 3pm          Time Out: 4pm                CODE  Minutes  Units   02198 Fluidotherapy     18519 Manual 20 1   63164 Therapeutic Ex 40 3   11212 Therapeutic Activity     98897 Paraffin     36261 Ultrasound      Other        60 4       Plan:   [x]  Continues Plan of care: Treatment covered based on POC and graduated to patient's progress. Pt education continues at each visit to obtain maximum benefits from skilled OT intervention.  []  Alter Plan of care:   []  Discharge:      Anisha IRWIN, [id number]

## 2025-01-09 ENCOUNTER — HOSPITAL ENCOUNTER (OUTPATIENT)
Dept: OCCUPATIONAL THERAPY | Age: 70
Setting detail: THERAPIES SERIES
Discharge: HOME | End: 2025-01-09
Payer: MEDICARE

## 2025-01-09 PROCEDURE — 97110 THERAPEUTIC EXERCISES: CPT

## 2025-01-09 PROCEDURE — 97140 MANUAL THERAPY 1/> REGIONS: CPT

## 2025-01-09 NOTE — PROGRESS NOTES
Chief Complaint   Patient presents with    Post-Op Check     Postop 11/18/24 ORIF rt distal radius, pain comes and goes with certain movements        OP:SURGEON: Dr. Narayan Lees DO  DATE OF PROCEDURE: 11-18-24  PROCEDURE: RIGHT DISTAL RADIUS FRACTURE, OPEN REDUCTION WITH INTERNAL FIXATION     POD: 6 weeks    Subjective:  Rhonda Alcantar is following up from the above surgery.  Patient was given a splint last visit although she does not present with her today.  Admits to not wearing the splint trying to use the hand despite recommendations for limited weightbearing.  She is in physical therapy and is trying to work on regaining motion.  Still dyspneic mild stiffness to the right wrist.  Denies any issues with the left wrist.  Denies calf pain, chest pain, or shortness of breath.  Denies any fevers or chills.    Review of Systems -  All pertinent positives/negatives per HPI     Objective:    General: Alert and oriented X 3, normocephalic atraumatic, external ears and eye normal, sclera clear, no acute distress, respirations easy and unlabored with no audible wheezes, skin warm and dry, speech and dress appropriate for noted age, affect euthymic.    Extremity:  Right Upper Extremity  Surgical incision is healing well, dry and intact, no erythema, no significant swelling about the wrist  There is limited ROM of the wrist 15/25 extension/flexion actively, fingertip to palm approximately 1 cm index through small fingers  There is no crepitus with active or passive ROM through the wrist joint  4/5  strength  2+ Radial pulse, fingers warm with BCR  Flex/extension intact to wrist, thumb and fingers   Finger opposition intact  Finger adduction/abduction intact  Finger crossover intact  Subjectively states sensation is intact to light touch over the Median Nerve, Ulnar Nerve, and Radial Nerve distribution      BP (!) 155/75   Pulse 91   Temp (!) 96.6 °F (35.9 °C)   Resp 20   SpO2 98%     XR:   Right wrist

## 2025-01-09 NOTE — PROGRESS NOTES
Occupational Therapy    OCCUPATIONAL THERAPY DAILY TX NOTE  [facility]  PAN OCCUPATIONAL THERAPY  [address] [facility] [address]  Dept: [phone]  Loc: [phone]   [facility] OT Fax: [phone]     Date:  2025   Initial Evaluation Date: 12/10/2024                          Evaluating Therapist: Dilcia Carvajal OT     Patient Name:  Rhonda Alcantar                       :  1955     Restrictions/Precautions:  Per Radius ORIF Protocol, low fall risk  Diagnosis:  R Wrist Radius ORIF & Ular Styloid Fx & L wrist fx too; S52.501A (ICD-10-CM) - Closed fracture of distal end of right radius, unspecified fracture morphology, initial encounter; S52.515A (ICD-10-CM) - Closed nondisplaced fracture of styloid process of left radius, initial encounter   Date of Surgery/Injury: 2024 sx (7 weeks post op)     Insurance/Certification information:  BCBS Medicare - [employer] Tracking#[id number]   Plan of care signed (Y/N): Yes- electronically signed IE  Visit# / total visits:  until 3/10/25     Referring Practitioner:  Stacie Crews PA-C  Specific Practitioner Orders: BRITTANY HORVATH.  Work on ROM and strengthening of the wrist, hand and digits.      Assessment of current deficits   [x] ADLs          [x] IADLs         [x] Strength      [x] ROM          [x] Pain            [x] Sensation     [x]Fine Motor Coordination     [x] Edema         [x] Scar Adhesion/Skin Integrity   [x] Motor Endurance     OT PLAN OF CARE   OT POC based on physician orders, patient diagnosis and results of clinical assessment.     Frequency/Duration: 2-3x/wk for 18 visits  /  Certification Period From: 12/10/2024  To: 3/10/2025     Specific OT Treatment to include:   [x] Instruction in HEP                   Modalities:  [x] Therapeutic Exercise                 [x] Ultrasound             [x] PROM/Stretching                    [x] Fluidotherapy          [x]  Paraffin                   [x] AAROM  [x] AROM                   [x]

## 2025-01-13 ENCOUNTER — HOSPITAL ENCOUNTER (OUTPATIENT)
Dept: OCCUPATIONAL THERAPY | Age: 70
Setting detail: THERAPIES SERIES
Discharge: HOME | End: 2025-01-13
Payer: MEDICARE

## 2025-01-13 PROCEDURE — 97110 THERAPEUTIC EXERCISES: CPT

## 2025-01-13 PROCEDURE — 97140 MANUAL THERAPY 1/> REGIONS: CPT

## 2025-01-13 NOTE — PROGRESS NOTES
Occupational Therapy    OCCUPATIONAL THERAPY DAILY TX NOTE  [facility]  PAN OCCUPATIONAL THERAPY  [address] [facility] [address]  Dept: [phone]  Loc: [phone]   [facility] OT Fax: [phone]     Date:  2025   Initial Evaluation Date: 12/10/2024                          Evaluating Therapist: Dilcia Carvajal OT     Patient Name:  Rhonda Alcantar                       :  1955     Restrictions/Precautions:  Per Radius ORIF Protocol, low fall risk  Diagnosis:  R Wrist Radius ORIF & Ular Styloid Fx & L wrist fx too; S52.501A (ICD-10-CM) - Closed fracture of distal end of right radius, unspecified fracture morphology, initial encounter; S52.515A (ICD-10-CM) - Closed nondisplaced fracture of styloid process of left radius, initial encounter   Date of Surgery/Injury: 2024 sx (8 weeks post op)     Insurance/Certification information:  [employer]BS Medicare - [employer] Tracking#[id number]   Plan of care signed (Y/N): Yes- electronically signed IE  Visit# / total visits:  until 3/10/25     Referring Practitioner:  Stacie Crews PA-C  Specific Practitioner Orders: BRITTANY HORVATH.  Work on ROM and strengthening of the wrist, hand and digits.      Assessment of current deficits   [x] ADLs          [x] IADLs         [x] Strength      [x] ROM          [x] Pain            [x] Sensation     [x]Fine Motor Coordination     [x] Edema         [x] Scar Adhesion/Skin Integrity   [x] Motor Endurance     OT PLAN OF CARE   OT POC based on physician orders, patient diagnosis and results of clinical assessment.     Frequency/Duration: 2-3x/wk for 18 visits  /  Certification Period From: 12/10/2024  To: 3/10/2025     Specific OT Treatment to include:   [x] Instruction in HEP                   Modalities:  [x] Therapeutic Exercise                 [x] Ultrasound             [x] PROM/Stretching                    [x] Fluidotherapy          [x]  Paraffin                   [x] AAROM  [x] AROM                   [x]

## 2025-01-15 ENCOUNTER — HOSPITAL ENCOUNTER (OUTPATIENT)
Dept: OCCUPATIONAL THERAPY | Age: 70
Setting detail: THERAPIES SERIES
Discharge: HOME | End: 2025-01-15
Payer: MEDICARE

## 2025-01-15 PROCEDURE — 97140 MANUAL THERAPY 1/> REGIONS: CPT

## 2025-01-15 PROCEDURE — 97110 THERAPEUTIC EXERCISES: CPT

## 2025-01-15 NOTE — PROGRESS NOTES
Occupational Therapy    OCCUPATIONAL THERAPY DAILY TX NOTE  [facility]  PAN OCCUPATIONAL THERAPY  [address] [facility] [address]  Dept: [phone]  Loc: [phone]   [facility] OT Fax: [phone]     Date:  1/15/2025   Initial Evaluation Date: 12/10/2024                          Evaluating Therapist: Dilcia Carvajal OT     Patient Name:  Rhonda Alcantar                       :  1955     Restrictions/Precautions:  Per Radius ORIF Protocol, low fall risk  Diagnosis:  R Wrist Radius ORIF & Ular Styloid Fx & L wrist fx too; S52.501A (ICD-10-CM) - Closed fracture of distal end of right radius, unspecified fracture morphology, initial encounter; S52.515A (ICD-10-CM) - Closed nondisplaced fracture of styloid process of left radius, initial encounter   Date of Surgery/Injury: 2024 sx (8 weeks post op)     Insurance/Certification information:  BCBS Medicare - [employer] Tracking#[id number]   Plan of care signed (Y/N): Yes- electronically signed IE  Visit# / total visits:  until 3/10/25     Referring Practitioner:  Stacie Crews PA-C  Specific Practitioner Orders: BRITTANY HORVATH.  Work on ROM and strengthening of the wrist, hand and digits.      Assessment of current deficits   [x] ADLs          [x] IADLs         [x] Strength      [x] ROM          [x] Pain            [x] Sensation     [x]Fine Motor Coordination     [x] Edema         [x] Scar Adhesion/Skin Integrity   [x] Motor Endurance     OT PLAN OF CARE   OT POC based on physician orders, patient diagnosis and results of clinical assessment.     Frequency/Duration: 2-3x/wk for 18 visits  /  Certification Period From: 12/10/2024  To: 3/10/2025     Specific OT Treatment to include:   [x] Instruction in HEP                   Modalities:  [x] Therapeutic Exercise                 [x] Ultrasound             [x] PROM/Stretching                    [x] Fluidotherapy          [x]  Paraffin                   [x] AAROM  [x] AROM                   [x]

## 2025-01-20 ENCOUNTER — HOSPITAL ENCOUNTER (OUTPATIENT)
Dept: OCCUPATIONAL THERAPY | Age: 70
Setting detail: THERAPIES SERIES
Discharge: HOME | End: 2025-01-20
Payer: MEDICARE

## 2025-01-20 PROCEDURE — 97140 MANUAL THERAPY 1/> REGIONS: CPT

## 2025-01-20 PROCEDURE — 97110 THERAPEUTIC EXERCISES: CPT

## 2025-01-20 NOTE — PROGRESS NOTES
Occupational Therapy    OCCUPATIONAL THERAPY PROGRESS UPDATE  [facility]  PAN OCCUPATIONAL THERAPY  [address] [facility] [address]  Dept: [phone]  Loc: [phone]   [facility] OT Fax: [phone]     Date:  2025   Initial Evaluation Date: 12/10/2024                          Evaluating Therapist: Dilcia Carvajal OT     Patient Name:  Rhonda Alcantar                       :  1955     Restrictions/Precautions:  Per Radius ORIF Protocol, low fall risk  Diagnosis:  R Wrist Radius ORIF & Ular Styloid Fx & L wrist fx too; S52.501A (ICD-10-CM) - Closed fracture of distal end of right radius, unspecified fracture morphology, initial encounter; S52.515A (ICD-10-CM) - Closed nondisplaced fracture of styloid process of left radius, initial encounter   Date of Surgery/Injury: 2024 sx (9 wks post op)     Insurance/Certification information:  BCBS Medicare - [employer] Tracking#[id number]   Plan of care signed (Y/N): Yes- electronically signed IE  Visit# / total visits: 10/ 14 until 3/10/25     Referring Practitioner:  Stacie Crews PA-C  Specific Practitioner Orders: BRITTANY HORVATH.  Work on ROM and strengthening of the wrist, hand and digits.      Assessment of current deficits   [x] ADLs          [x] IADLs         [x] Strength      [x] ROM          [x] Pain            [x] Sensation     [x]Fine Motor Coordination     [x] Edema         [x] Scar Adhesion/Skin Integrity   [x] Motor Endurance     OT PLAN OF CARE   OT POC based on physician orders, patient diagnosis and results of clinical assessment.     Frequency/Duration: 2-3x/wk for 18 visits  /  Certification Period From: 12/10/2024  To: 3/10/2025     Specific OT Treatment to include:   [x] Instruction in HEP                   Modalities:  [x] Therapeutic Exercise                 [x] Ultrasound             [x] PROM/Stretching                    [x] Fluidotherapy          [x]  Paraffin                   [x] AAROM  [x] AROM                   [x]

## 2025-01-22 ENCOUNTER — HOSPITAL ENCOUNTER (OUTPATIENT)
Dept: OCCUPATIONAL THERAPY | Age: 70
Setting detail: THERAPIES SERIES
Discharge: HOME | End: 2025-01-22
Payer: MEDICARE

## 2025-01-22 PROCEDURE — 97110 THERAPEUTIC EXERCISES: CPT

## 2025-01-22 PROCEDURE — 97140 MANUAL THERAPY 1/> REGIONS: CPT

## 2025-01-22 NOTE — PROGRESS NOTES
Occupational Therapy    OCCUPATIONAL THERAPY DAILY NOTE  [facility]  PAN OCCUPATIONAL THERAPY  [address] [address]  Dept: [phone]  Loc: [phone]   [facility] OT Fax: [phone]     Date:  2025   Initial Evaluation Date: 12/10/2024                          Evaluating Therapist: Dilcia Carvajal OT     Patient Name:  Rhonda Alcantar                       :  1955     Restrictions/Precautions:  Per Radius ORIF Protocol, low fall risk  Diagnosis:  R Wrist Radius ORIF & Ular Styloid Fx & L wrist fx too; S52.501A (ICD-10-CM) - Closed fracture of distal end of right radius, unspecified fracture morphology, initial encounter; S52.515A (ICD-10-CM) - Closed nondisplaced fracture of styloid process of left radius, initial encounter   Date of Surgery/Injury: 2024 sx (9 wks post op)     Insurance/Certification information:  BCBS Medicare - [employer] Tracking#[id number]   Plan of care signed (Y/N): Yes- electronically signed IE  Visit# / total visits:  until 3/10/25     Referring Practitioner:  Stacie Crews PA-C  Specific Practitioner Orders: BRITTANY HORVATH.  Work on ROM and strengthening of the wrist, hand and digits.      Assessment of current deficits   [x] ADLs          [x] IADLs         [x] Strength      [x] ROM          [x] Pain            [x] Sensation     [x]Fine Motor Coordination     [x] Edema         [x] Scar Adhesion/Skin Integrity   [x] Motor Endurance     OT PLAN OF CARE   OT POC based on physician orders, patient diagnosis and results of clinical assessment.     Frequency/Duration: 2-3x/wk for 18 visits  /  Certification Period From: 12/10/2024  To: 3/10/2025     Specific OT Treatment to include:   [x] Instruction in HEP                   Modalities:  [x] Therapeutic Exercise                 [x] Ultrasound             [x] PROM/Stretching                    [x] Fluidotherapy          [x]  Paraffin                   [x] AAROM  [x] AROM                   [x]

## 2025-01-27 ENCOUNTER — HOSPITAL ENCOUNTER (OUTPATIENT)
Dept: OCCUPATIONAL THERAPY | Age: 70
Setting detail: THERAPIES SERIES
Discharge: HOME | End: 2025-01-27
Payer: MEDICARE

## 2025-01-27 PROCEDURE — 97140 MANUAL THERAPY 1/> REGIONS: CPT

## 2025-01-27 PROCEDURE — 97110 THERAPEUTIC EXERCISES: CPT

## 2025-01-27 NOTE — PROGRESS NOTES
forearm supination            L digits/wrist/forearm X    All planes x 20   PROM/Stretching:     R digits, wrist, forearm X  X  X  X All planes, as kinjal w/ gently ^ing aggressiveness  R wrist joint mobs  MP flexion stretches   Prayer stretches/ composite flexor stretches   L digits, wrist & forearm X All planes, as kinjal   Fine Motor/ Therapeutic Activities:     R hand  X  X     In hand manipulation tasks facilitated  Pinching tasks facilitated w/9 hole peg today  Prehension skills facilitated for buttoning, tying, zippering. Pt completes well  Fine motor board: Cylinder screws, nuts, washer, tiny caps for attaching & stacking. Pt dons/doffs 10 reps with very minimal difficulty        Scar Mass/Edema Control:     R wrist/forearm/ hand X Edema flushing massage   R wrist scar mgmt X Hands on massage   R wrist desensitization X Hands on   Strengthening:     Gross BUE X       UBE x ^d 12 minutes @ 4.0 resistance for start of session muscle warm up and promoting tight fxl grasp. Pt tolerates well  2# dumbbell bicep curls x 30 reps, tricep extension at side x 20 reps , serving arms x 20 reps, oh press x 10 reps   Bilateral hands focus R 3 x 10 reps each    X        HEP Therapist completes hands on resistive ex's of fingers into composite flexion & extension, ABD/ADD followed by individual digits  Yellow theraputty for gripping, pulling, Wbing presses thru palm and with MP's flexed into a fist, pinching    Light resistance orange/yellow mix theraputty for gripping, rolling for digital extension & R hand MP flexion stretches,  WBing thru palm as kinjal, pinches & pulls today. Therapist instructs pt to cont with for HEP 1-2 x a day. Pt reports ^d discomfort wrist when completing   BUE wrist & forearm X 30 reps each        X   1# dumbbell, wrist & 2# dumbbell forearm and/or hands on resistance- all planes. Mild discomfort reported R wrist/hand ulnarly    Red resistance therabar for gentle wringing, up & down bends & rolling across

## 2025-01-29 ENCOUNTER — HOSPITAL ENCOUNTER (OUTPATIENT)
Dept: OCCUPATIONAL THERAPY | Age: 70
Setting detail: THERAPIES SERIES
Discharge: HOME | End: 2025-01-29
Payer: MEDICARE

## 2025-01-29 PROCEDURE — 97110 THERAPEUTIC EXERCISES: CPT

## 2025-01-29 PROCEDURE — 97140 MANUAL THERAPY 1/> REGIONS: CPT

## 2025-01-29 NOTE — PROGRESS NOTES
Occupational Therapy    OCCUPATIONAL THERAPY DAILY NOTE  [facility]  PAN OCCUPATIONAL THERAPY  [address] [facility] [address]  Dept: [phone]  Loc: [phone]   [facility] OT Fax: [phone]     Date:  2025   Initial Evaluation Date: 12/10/2024                          Evaluating Therapist: Dilcia Carvajal OT     Patient Name:  Rhonda Alcantar                       :  1955     Restrictions/Precautions:  Per Radius ORIF Protocol, low fall risk  Diagnosis:  R Wrist Radius ORIF & Ular Styloid Fx & L wrist fx too; S52.501A (ICD-10-CM) - Closed fracture of distal end of right radius, unspecified fracture morphology, initial encounter; S52.515A (ICD-10-CM) - Closed nondisplaced fracture of styloid process of left radius, initial encounter   Date of Surgery/Injury: 2024 sx (9 wks post op)     Insurance/Certification information:  [employer] Medicare - [employer] Tracking#[id number]   Plan of care signed (Y/N): Yes- electronically signed IE  Visit# / total visits:  until 3/10/25     Referring Practitioner:  Stacie Crews PA-C  Specific Practitioner Orders: BRITTANY HORVATH.  Work on ROM and strengthening of the wrist, hand and digits.      Assessment of current deficits   [x] ADLs          [x] IADLs         [x] Strength      [x] ROM          [x] Pain            [x] Sensation     [x]Fine Motor Coordination     [x] Edema         [x] Scar Adhesion/Skin Integrity   [x] Motor Endurance     OT PLAN OF CARE   OT POC based on physician orders, patient diagnosis and results of clinical assessment.     Frequency/Duration: 2-3x/wk for 18 visits  /  Certification Period From: 12/10/2024  To: 3/10/2025     Specific OT Treatment to include:   [x] Instruction in HEP                   Modalities:  [x] Therapeutic Exercise                 [x] Ultrasound             [x] PROM/Stretching                    [x] Fluidotherapy          [x]  Paraffin                   [x] AAROM  [x] AROM                   [x]

## 2025-02-01 PROBLEM — Z01.818 PRE-OP TESTING: Status: ACTIVE | Noted: 2025-02-01

## 2025-02-04 ENCOUNTER — OFFICE VISIT (OUTPATIENT)
Dept: ORTHOPEDIC SURGERY | Age: 70
End: 2025-02-04

## 2025-02-04 VITALS
HEART RATE: 86 BPM | OXYGEN SATURATION: 98 % | SYSTOLIC BLOOD PRESSURE: 169 MMHG | TEMPERATURE: 97.1 F | DIASTOLIC BLOOD PRESSURE: 83 MMHG

## 2025-02-04 DIAGNOSIS — S52.501A CLOSED FRACTURE OF DISTAL END OF RIGHT RADIUS, UNSPECIFIED FRACTURE MORPHOLOGY, INITIAL ENCOUNTER: Primary | ICD-10-CM

## 2025-02-04 PROCEDURE — 99024 POSTOP FOLLOW-UP VISIT: CPT | Performed by: PHYSICIAN ASSISTANT

## 2025-02-04 NOTE — PROGRESS NOTES
Chief Complaint   Patient presents with    Follow-up     Right distal radius ORIF 11/18/24. She is doing much better, almost done with PT       OP:SURGEON: Dr. Narayan Lees DO  DATE OF PROCEDURE: 11-18-24  PROCEDURE: RIGHT DISTAL RADIUS FRACTURE, OPEN REDUCTION WITH INTERNAL FIXATION    Subjective:  Rhonda Alcantar is approximately 11 weeks from surgery.  Overall she is doing well still with some pain throughout the wrist but improving.  States that she has 1 more occupational therapy session left, but is transparent about her noncompliance with home exercise program.  States the wrist and fingers and thumb are still very stiff.  She still wears a wrist brace.  She is partial weightbearing.    Review of Systems -  all pertinent positives and negatives in HPI.      Objective:    General: Alert and oriented X 3, normocephalic atraumatic, external ears and eye normal, sclera clear, no acute distress, respirations easy and unlabored with no audible wheezes, skin warm and dry, speech and dress appropriate for noted age, affect euthymic.    Extremity:  Right Upper Extremity  Skin is clean dry and intact  Radial pulse palpable, fingers warm with BCR  Flex/extension intact to wrist, thumb and fingers  Finger opposition intact  Finger adduction/abduction intact  Finger crossover intact  Subjectively states sensation intact to radial/medial/ulnar distribution  Incision is completely healed, no signs of infection  Very minimal tenderness to palpation over the distal radius, mostly at the volar aspect of the wrist  Just barely able to make full composite fist with all fingers touching palm, but fingers and thumb very stiff.  Active wrist flexion to about 20 degrees, extension to 10 degrees          XR:   3 views of right wrist demonstrating interval healing of distal radius fracture status post ORIF. Hardware remains intact without interval displacement, loosening, or failure. No significant change in alignment. No acute

## 2025-02-05 ENCOUNTER — HOSPITAL ENCOUNTER (OUTPATIENT)
Dept: OCCUPATIONAL THERAPY | Age: 70
Setting detail: THERAPIES SERIES
Discharge: HOME | End: 2025-02-05
Payer: MEDICARE

## 2025-02-05 PROCEDURE — 97140 MANUAL THERAPY 1/> REGIONS: CPT

## 2025-02-05 PROCEDURE — 97530 THERAPEUTIC ACTIVITIES: CPT

## 2025-02-05 PROCEDURE — 97110 THERAPEUTIC EXERCISES: CPT

## 2025-02-05 NOTE — PROGRESS NOTES
Occupational Therapy    OCCUPATIONAL THERAPY D/C Summary  [facility]  PAN OCCUPATIONAL THERAPY  [address] [address] [address]  Dept: [phone]  Loc: [phone]   [facility] OT Fax: [phone]     Date:  2025   Initial Evaluation Date: 12/10/2024                          Evaluating Therapist: Dilcia Carvajal OT     Patient Name:  Rhonda Alcantar                       :  1955     Restrictions/Precautions:  Per Radius ORIF Protocol, low fall risk  Diagnosis:  R Wrist Radius ORIF & Ular Styloid Fx & L wrist fx too; S52.501A (ICD-10-CM) - Closed fracture of distal end of right radius, unspecified fracture morphology, initial encounter; S52.515A (ICD-10-CM) - Closed nondisplaced fracture of styloid process of left radius, initial encounter   Date of Surgery/Injury: 2024 sx (11 wks post op)     Insurance/Certification information:  [employer] Medicare - [employer] Tracking#[id number]   Plan of care signed (Y/N): Yes- electronically signed IE  Visit# / total visits:  until 3/10/25     Referring Practitioner:  Stacie Crews PA-C  Specific Practitioner Orders: BRITTANY HORVATH.  Work on ROM and strengthening of the wrist, hand and digits.      Assessment of current deficits   [x] ADLs          [x] IADLs         [x] Strength      [x] ROM          [x] Pain            [x] Sensation     [x]Fine Motor Coordination     [x] Edema         [x] Scar Adhesion/Skin Integrity   [x] Motor Endurance     OT PLAN OF CARE   OT POC based on physician orders, patient diagnosis and results of clinical assessment.     Frequency/Duration: 2-3x/wk for 18 visits  /  Certification Period From: 12/10/2024  To: 3/10/2025     Specific OT Treatment to include:   [x] Instruction in HEP                   Modalities:  [x] Therapeutic Exercise                 [x] Ultrasound             [x] PROM/Stretching                    [x] Fluidotherapy          [x]  Paraffin                   [x] AAROM  [x] AROM                   [x]

## 2025-03-03 PROBLEM — Z01.818 PRE-OP TESTING: Status: RESOLVED | Noted: 2025-02-01 | Resolved: 2025-03-03

## 2025-07-08 ENCOUNTER — OFFICE VISIT (OUTPATIENT)
Dept: FAMILY MEDICINE CLINIC | Age: 70
End: 2025-07-08
Payer: MEDICARE

## 2025-07-08 VITALS
SYSTOLIC BLOOD PRESSURE: 136 MMHG | HEIGHT: 61 IN | TEMPERATURE: 101.1 F | WEIGHT: 163 LBS | OXYGEN SATURATION: 99 % | DIASTOLIC BLOOD PRESSURE: 74 MMHG | HEART RATE: 103 BPM | BODY MASS INDEX: 30.78 KG/M2

## 2025-07-08 DIAGNOSIS — J01.40 ACUTE NON-RECURRENT PANSINUSITIS: Primary | ICD-10-CM

## 2025-07-08 LAB
Lab: NORMAL
PERFORMING INSTRUMENT: NORMAL
QC PASS/FAIL: NORMAL
SARS-COV-2, POC: NORMAL

## 2025-07-08 PROCEDURE — 1159F MED LIST DOCD IN RCRD: CPT | Performed by: PHYSICIAN ASSISTANT

## 2025-07-08 PROCEDURE — 1123F ACP DISCUSS/DSCN MKR DOCD: CPT | Performed by: PHYSICIAN ASSISTANT

## 2025-07-08 PROCEDURE — 87426 SARSCOV CORONAVIRUS AG IA: CPT | Performed by: PHYSICIAN ASSISTANT

## 2025-07-08 PROCEDURE — 99213 OFFICE O/P EST LOW 20 MIN: CPT | Performed by: PHYSICIAN ASSISTANT

## 2025-07-08 PROCEDURE — 1160F RVW MEDS BY RX/DR IN RCRD: CPT | Performed by: PHYSICIAN ASSISTANT

## 2025-07-08 RX ORDER — CEFDINIR 300 MG/1
300 CAPSULE ORAL 2 TIMES DAILY
Qty: 20 CAPSULE | Refills: 0 | Status: SHIPPED | OUTPATIENT
Start: 2025-07-08 | End: 2025-07-18

## 2025-07-08 RX ORDER — SACCHAROMYCES BOULARDII 250 MG
250 CAPSULE ORAL 2 TIMES DAILY
Qty: 28 CAPSULE | Refills: 0 | Status: SHIPPED | OUTPATIENT
Start: 2025-07-08 | End: 2025-07-22

## 2025-07-08 NOTE — PROGRESS NOTES
Chief Complaint   Congestion (X2-3 days with sinus pressure and headache/Taking advil cold and sinus but not helping /) and Headache      History of Present Illness   Source of history provided by: patient.    History of Present Illness  The patient is a 69-year-old female who presents for evaluation of headaches and nasal congestion for the past 2 to 3 days.    She has been experiencing sinus congestion and frontal headaches for the past 2 to 3 days, accompanied by mild ear pain on the left side. She reports no chest congestion or cough. She was unaware of having a fever at home but recalls an episode of chills while sitting. Her temperature is 101.1 in office currently. She does not report any postnasal drainage or throat discomfort. She reports no recent tick bites. She has not been able to eat much since Sunday, with her diet limited to water yesterday and half a piece of toast today. She does not report any nausea. She has been taking Advil Cold and Sinus, which contains pseudoephedrine, but it has been causing her to feel jittery.    ALLERGIES  The patient has no known allergies to medications.    MEDICATIONS  Advil cold and sinus, multivitamins, supplements      ROS    Unless otherwise stated in this report or unable to obtain because of the patient's clinical or mental status as evidenced by the medical record, this patients's positive and negative responses for Review of Systems, constitutional, psych, eyes, ENT, cardiovascular, respiratory, gastrointestinal, neurological, genitourinary, musculoskeletal, integument systems and systems related to the presenting problem are either stated in the preceding or were not pertinent or were negative for the symptoms and/or complaints related to the medical problem.    Past Medical History:  has a past medical history of GERD (gastroesophageal reflux disease), Hiatal hernia, Lumbar disc disease, and Shingles.  Past Surgical History:  has a past surgical history

## (undated) DEVICE — SCREW BNE L14MM DIA2.4MM CORT S STL ST T8 STARDRV RECESS: Type: IMPLANTABLE DEVICE | Site: WRIST | Status: NON-FUNCTIONAL

## (undated) DEVICE — STRAP POS MP 30X3 IN HK LOOP CLOSURE FOAM DISP

## (undated) DEVICE — BIT DRL L110MM DIA1.8MM QUIK CPL CALIB W/O STP REUSE

## (undated) DEVICE — BIT DRL L100MM DIA2MM ST QUIK CPL NONRADIOPAQUE W/O STP

## (undated) DEVICE — SCREW BNE L24MM DIA2.4MM CORT S STL ST T8 STARDRV RECESS: Type: IMPLANTABLE DEVICE | Site: WRIST | Status: NON-FUNCTIONAL

## (undated) DEVICE — PADDING,UNDERCAST,COTTON, 3X4YD STERILE: Brand: MEDLINE

## (undated) DEVICE — DRAPE,HAND,STERILE: Brand: MEDLINE

## (undated) DEVICE — ZIMMER® STERILE DISPOSABLE TOURNIQUET CUFF WITH PROTECTIVE SLEEVE AND PLC, DUAL PORT, SINGLE BLADDER, 18 IN. (46 CM)

## (undated) DEVICE — 4-PORT MANIFOLD: Brand: NEPTUNE 2

## (undated) DEVICE — UPPER EXTREMITY: Brand: MEDLINE INDUSTRIES, INC.

## (undated) DEVICE — SCREW BNE L14MM DIA2.7MM CORT S STL ST T8 STARDRV RECESS: Type: IMPLANTABLE DEVICE | Site: WRIST | Status: NON-FUNCTIONAL

## (undated) DEVICE — ELECTRODE PT RET AD L9FT HI MOIST COND ADH HYDRGEL CORDED

## (undated) DEVICE — BNDG,ELSTC,MATRIX,STRL,3"X5YD,LF,HOOK&LP: Brand: MEDLINE

## (undated) DEVICE — DRAPE EQUIP CARM 72X42 IN RUBBER BND CLP

## (undated) DEVICE — SYRINGE MED 10ML TRNSLUC BRL PLUNG BLK MRK POLYPR CTRL

## (undated) DEVICE — PADDING,UNDERCAST,COTTON, 4"X4YD STERILE: Brand: MEDLINE